# Patient Record
Sex: MALE | Race: BLACK OR AFRICAN AMERICAN | NOT HISPANIC OR LATINO | Employment: FULL TIME | URBAN - METROPOLITAN AREA
[De-identification: names, ages, dates, MRNs, and addresses within clinical notes are randomized per-mention and may not be internally consistent; named-entity substitution may affect disease eponyms.]

---

## 2017-02-10 ENCOUNTER — OFFICE VISIT (OUTPATIENT)
Dept: CARDIOLOGY | Facility: CLINIC | Age: 52
End: 2017-02-10
Payer: COMMERCIAL

## 2017-02-10 ENCOUNTER — CLINICAL SUPPORT (OUTPATIENT)
Dept: CARDIOLOGY | Facility: CLINIC | Age: 52
End: 2017-02-10
Payer: COMMERCIAL

## 2017-02-10 VITALS
SYSTOLIC BLOOD PRESSURE: 132 MMHG | WEIGHT: 251 LBS | BODY MASS INDEX: 34 KG/M2 | HEIGHT: 72 IN | HEART RATE: 65 BPM | DIASTOLIC BLOOD PRESSURE: 80 MMHG

## 2017-02-10 DIAGNOSIS — E78.5 HYPERLIPIDEMIA, UNSPECIFIED HYPERLIPIDEMIA TYPE: ICD-10-CM

## 2017-02-10 DIAGNOSIS — B20 HIV (HUMAN IMMUNODEFICIENCY VIRUS INFECTION): ICD-10-CM

## 2017-02-10 DIAGNOSIS — B19.20 HEPATITIS C VIRUS INFECTION WITHOUT HEPATIC COMA, UNSPECIFIED CHRONICITY: ICD-10-CM

## 2017-02-10 DIAGNOSIS — R07.9 CHEST PAIN SYNDROME: Primary | ICD-10-CM

## 2017-02-10 DIAGNOSIS — R07.9 CHEST PAIN, UNSPECIFIED TYPE: Primary | ICD-10-CM

## 2017-02-10 DIAGNOSIS — R07.9 CHEST PAIN, UNSPECIFIED TYPE: ICD-10-CM

## 2017-02-10 PROCEDURE — 99999 PR PBB SHADOW E&M-NEW PATIENT-LVL III: CPT | Mod: PBBFAC,,, | Performed by: INTERNAL MEDICINE

## 2017-02-10 PROCEDURE — 99204 OFFICE O/P NEW MOD 45 MIN: CPT | Mod: S$GLB,,, | Performed by: INTERNAL MEDICINE

## 2017-02-10 PROCEDURE — 93000 ELECTROCARDIOGRAM COMPLETE: CPT | Mod: S$GLB,,, | Performed by: INTERNAL MEDICINE

## 2017-02-10 RX ORDER — ATORVASTATIN CALCIUM 10 MG/1
10 TABLET, FILM COATED ORAL DAILY
COMMUNITY

## 2017-02-10 RX ORDER — TAMSULOSIN HYDROCHLORIDE 0.4 MG/1
0.4 CAPSULE ORAL DAILY
COMMUNITY

## 2017-02-10 RX ORDER — TADALAFIL 5 MG/1
5 TABLET ORAL DAILY PRN
COMMUNITY

## 2017-02-10 RX ORDER — NITROGLYCERIN 0.4 MG/1
0.4 TABLET SUBLINGUAL EVERY 5 MIN PRN
Qty: 60 TABLET | Refills: 12 | Status: SHIPPED | OUTPATIENT
Start: 2017-02-10

## 2017-02-10 RX ORDER — PANTOPRAZOLE SODIUM 40 MG/1
40 TABLET, DELAYED RELEASE ORAL DAILY
COMMUNITY

## 2017-02-10 RX ORDER — ASPIRIN 81 MG/1
81 TABLET ORAL DAILY
Qty: 30 TABLET | Refills: 6 | Status: SHIPPED | OUTPATIENT
Start: 2017-02-10

## 2017-02-10 NOTE — PROGRESS NOTES
Subjective:   Patient ID:  Semaj Allen is a 52 y.o. male who presents for evaluation of Chest Pain      HPI  A 53 yo male with h/o hiv disease hepatitis c treated gerd htn hlp is here refered from DR MAR FOR CHEST PAIN.HE WAS TREATED WITH HARVONI FOR HIS HEP C. HE AHS BEEN HAVING RECURRENT EPISODE OF CHEST PAIN IN THE MIDDLE OF THE CHEST THAT OCCURRED ON Sunday WHILE SITTING DOWN NOT HAVING ANY ACTIVITY IT WAS IN THE MIDDLE OF THE CHEST WAS SEVERE HE DOUBLED OVER HE TOOK 2 ASPIRINS AND IT RESOLVED ON ITS OWN HE FELT BAD.,THEN Wednesday HE HAD ANOTHER EPISODE WHILE PULLING A BUGGY AT HIS JOB AT [facility] [employer]. HE HAS ALSO THE SAME FEELING IT RESOLVED AFTER FEW MINUTES NO SWEATING NO SHORTNESS OF BREATH., HE HAS NO CLAUDICATION TIA HE HAS NO SYMPTOMS OF CHF HAS SNORING MINIMAL NO BLEEDING ISSUES  EKG SHOWS LOW ANTERIOR  FORCES. HE WA SIN THE LOBBY NOW FEELING NUMBNESS IN HIS ARM BUT NO CHEST PAIN BUT FELT IT IS GOING TO HAPPEN.  Past Medical History   Diagnosis Date    Allergic rhinitis     Chest pain syndrome 2/10/2017    Depression     GERD (gastroesophageal reflux disease)     Hepatitis C     Hepatitis C 2/10/2017    Herpes simplex     HIV (human immunodeficiency virus infection) 2/10/2017    HIV disease     Hyperlipidemia     Hypertrophy of prostate with urinary obstruction and other lower urinary tract symptoms (LUTS)        Past Surgical History   Procedure Laterality Date    Jaw surgey      Leg surgery         Social History   Substance Use Topics    Smoking status: Never Smoker    Smokeless tobacco: None    Alcohol use Yes       No family history on file.    Current Outpatient Prescriptions   Medication Sig    abacavir-dolutegravir-lamivud (TRIUMEQ) 600- mg Tab Take by mouth once daily.    atorvastatin (LIPITOR) 10 MG tablet Take 10 mg by mouth once daily.    pantoprazole (PROTONIX) 40 MG tablet Take 40 mg by mouth once daily.    tadalafil (CIALIS) 5 MG tablet Take 5 mg by mouth daily as  needed for Erectile Dysfunction.    tamsulosin (FLOMAX) 0.4 mg Cp24 Take 0.4 mg by mouth once daily.     No current facility-administered medications for this visit.      No current outpatient prescriptions on file prior to visit.     No current facility-administered medications on file prior to visit.        Review of patient's allergies indicates:  No Known Allergies    Review of Systems   Constitution: Positive for weight gain. Negative for weakness and malaise/fatigue.   HENT: Negative for headaches.    Eyes: Negative for blurred vision.   Cardiovascular: Positive for chest pain. Negative for claudication, cyanosis, dyspnea on exertion, irregular heartbeat, leg swelling, near-syncope, orthopnea, palpitations and paroxysmal nocturnal dyspnea.   Respiratory: Positive for shortness of breath and snoring. Negative for cough and hemoptysis.    Hematologic/Lymphatic: Negative for bleeding problem. Does not bruise/bleed easily.   Skin: Negative for dry skin and itching.   Musculoskeletal: Negative for falls, muscle weakness and myalgias.   Gastrointestinal: Negative for abdominal pain, diarrhea, heartburn, hematemesis, hematochezia and melena.   Genitourinary: Positive for nocturia. Negative for flank pain and hematuria.   Neurological: Negative for dizziness, focal weakness, light-headedness, numbness, paresthesias and seizures.   Psychiatric/Behavioral: Negative for altered mental status and memory loss. The patient is not nervous/anxious.    Allergic/Immunologic: Negative for hives.       Objective:   Physical Exam   Constitutional: He is oriented to person, place, and time. He appears well-developed and well-nourished. No distress.   HENT:   Head: Normocephalic and atraumatic.   Eyes: EOM are normal. Pupils are equal, round, and reactive to light. Right eye exhibits no discharge. Left eye exhibits no discharge.   Neck: Neck supple. No JVD present. No thyromegaly present.   Cardiovascular: Normal rate, regular  rhythm, normal heart sounds and intact distal pulses.  Exam reveals no gallop and no friction rub.    No murmur heard.  Pulmonary/Chest: Effort normal and breath sounds normal. No respiratory distress. He has no wheezes. He has no rales. He exhibits no tenderness.   Abdominal: Soft. Bowel sounds are normal. He exhibits no distension. There is no tenderness.   OBESE ABDOMEN   Musculoskeletal: Normal range of motion. He exhibits no edema.   Neurological: He is alert and oriented to person, place, and time. No cranial nerve deficit.   Skin: Skin is warm and dry. No rash noted. He is not diaphoretic. No erythema.   Psychiatric: He has a normal mood and affect. His behavior is normal.   Nursing note and vitals reviewed.    Vitals:    02/10/17 1512   BP: 132/80   BP Location: Right arm   Patient Position: Sitting   BP Method: Manual   Pulse: 65   Weight: 113.9 kg (251 lb)   Height: 6' (1.829 m)     No results found for: CHOL  No results found for: HDL  No results found for: LDLCALC  No results found for: TRIG  No results found for: CHOLHDL    Chemistry    No results found for: NA, K, CL, CO2, BUN, CREATININE, GLU No results found for: CALCIUM, ALKPHOS, AST, ALT, BILITOT       No results found for: TSH  No results found for: INR, PROTIME  No results found for: WBC, HGB, HCT, MCV, PLT  BNP  @LABRCNTIP(BNP,BNPTRIAGEBLO)@  CrCl cannot be calculated (Patient has no serum creatinine result on file.).  Assessment:     1. Chest pain syndrome    2. Hyperlipidemia, unspecified hyperlipidemia type    3. HIV (human immunodeficiency virus infection)    4. Hepatitis C virus infection without hepatic coma, unspecified chronicity        Plan:   HAS FEATURES OF TYPICAL AND ATYPICAL  ANGINA HE HAS MULTIPLE RISK FACTORS FOR CAD INCLUDING HIS ANTIRETROVIRAL MEDS  AND HYPERLIPIDEMIA FH AND HEPATITIS THERAPY. WILL NEED TO INVESTIGATE WITH STRESS ECHO   HE WAS GIVEN NITRO S/L PRESCRIPTION AND ASA EC 81 MG PO DAILY  FURTHER FOLLOW UP PENDING  TEST RESULTS.

## 2017-02-10 NOTE — LETTER
February 10, 2017      Sigrid Torres MD  [address]  [place] [address]           [facility] - Cardiology  [address]  [place] [address]  Phone: [phone]  Fax: [phone]          Patient: Semaj Allen   MR Number: [id number]   YOB: 1965   Date of Visit: 2/10/2017       Dear Dr. Sigrid Torres:    Thank you for referring Semaj Allen to me for evaluation. Attached you will find relevant portions of my assessment and plan of care.    If you have questions, please do not hesitate to call me. I look forward to following Semaj Allen along with you.    Sincerely,    Ishmael Dias MD    Enclosure  CC:  No Recipients    If you would like to receive this communication electronically, please contact [email or web address] or [phone] to request more information on [facility] access.    For providers and/or their staff who would like to refer a patient to [facility], please contact us through our one-stop-shop provider referral line, [facility], at [phone].    If you feel you have received this communication in error or would no longer like to receive these types of communications, please e-mail [email or web address]

## 2017-02-10 NOTE — MR AVS SNAPSHOT
[facility] Cardiology  [address] [place] [address]  Phone: [phone]  Fax: [phone]                  Semaj Allen   2/10/2017 2:15 PM   Office Visit    Description:  Male : 1965   Provider:  Ishmael Dias MD   Department:  [facility] - [facility]           Reason for Visit     Chest Pain           Diagnoses this Visit        Comments    Chest pain syndrome    -  Primary     Hyperlipidemia, unspecified hyperlipidemia type         HIV (human immunodeficiency virus infection)         Hepatitis C virus infection without hepatic coma, unspecified chronicity                To Do List           Goals (5 Years of Data)     None       These Medications        Disp Refills Start End    aspirin (ECOTRIN) 81 MG EC tablet 30 tablet 6 2/10/2017     Take 1 tablet (81 mg total) by mouth once daily. - Oral    nitroGLYCERIN (NITROSTAT) 0.4 MG SL tablet 60 tablet 12 2/10/2017     Place 1 tablet (0.4 mg total) under the tongue every 5 (five) minutes as needed for Chest pain. - Sublingual      [facility]     [facility] On Call Nurse Care Line -  Assistance  Registered nurses in the [facility] Center provide clinical advisement, health education, appointment booking, and other advisory services.  Call for this free service at [phone].             Medications           Message regarding Medications     Verify the changes and/or additions to your medication regime listed below are the same as discussed with your clinician today.  If any of these changes or additions are incorrect, please notify your healthcare provider.        START taking these NEW medications        Refills    aspirin (ECOTRIN) 81 MG EC tablet 6    Sig: Take 1 tablet (81 mg total) by mouth once daily.    Class: Print    Route: Oral    nitroGLYCERIN (NITROSTAT) 0.4 MG SL tablet 12    Sig: Place 1 tablet (0.4 mg total) under the tongue every 5 (five) minutes as needed for Chest pain.    Class: Print    Route: Sublingual            Verify that the below list of medications is an accurate representation of the medications you are currently taking.  If none reported, the list may be blank. If incorrect, please contact your healthcare provider. Carry this list with you in case of emergency.           Current Medications     abacavir-dolutegravir-lamivud (TRIUMEQ) 600- mg Tab Take by mouth once daily.    aspirin (ECOTRIN) 81 MG EC tablet Take 1 tablet (81 mg total) by mouth once daily.    atorvastatin (LIPITOR) 10 MG tablet Take 10 mg by mouth once daily.    nitroGLYCERIN (NITROSTAT) 0.4 MG SL tablet Place 1 tablet (0.4 mg total) under the tongue every 5 (five) minutes as needed for Chest pain.    pantoprazole (PROTONIX) 40 MG tablet Take 40 mg by mouth once daily.    tadalafil (CIALIS) 5 MG tablet Take 5 mg by mouth daily as needed for Erectile Dysfunction.    tamsulosin (FLOMAX) 0.4 mg Cp24 Take 0.4 mg by mouth once daily.           Clinical Reference Information           Your Vitals Were     BP Pulse Height Weight BMI    132/80 (BP Location: Right arm, Patient Position: Sitting, BP Method: Manual) 65 6' (1.829 m) 113.9 kg (251 lb) 34.04 kg/m2      Blood Pressure          Most Recent Value    Right Arm BP - Sitting  132/80    Left Arm BP - Sitting  124/80    BP  132/80      Allergies as of 2/10/2017     No Known Allergies      Immunizations Administered on Date of Encounter - 2/10/2017     None      Orders Placed During Today's Visit     Future Labs/Procedures Expected by Expires    EKG 12-LEAD - Muse  As directed 2/10/2018    Exercise stress echo with color flow  As directed 2/10/2018      [facility] Sign-Up     Activating your [facility] account is as easy as 1-2-3!     1) Visit [email or web address], select Sign Up Now, enter this activation code and your date of birth, then select Next.  [email or web address]  Expires: 3/27/2017  1:05 PM      2) Create a username and password to use when you visit [facility] in the future and select a  security question in case you lose your password and select Next.    3) Enter your e-mail address and click Sign Up!    Additional Information  If you have questions, please e-mail [email or web address] or call [phone] to talk to our [facility] staff. Remember, [facility] is NOT to be used for urgent needs. For medical emergencies, dial 911.         Language Assistance Services     ATTENTION: Language assistance services are available, free of charge. Please call [phone].      ATENCIÓN: Si habla español, tiene a garcia disposición servicios gratuitos de asistencia lingüística. Llame al [phone].     CHÚ Ý: N?u b?n nói Ti?ng Vi?t, có các d?ch v? h? tr? ngôn ng? mi?n phí dành cho b?n. G?i s? [phone].         [facility] complies with applicable Federal civil rights laws and does not discriminate on the basis of race, color, national origin, age, disability, or sex.

## 2017-02-15 ENCOUNTER — CLINICAL SUPPORT (OUTPATIENT)
Dept: CARDIOLOGY | Facility: CLINIC | Age: 52
End: 2017-02-15
Payer: COMMERCIAL

## 2017-02-15 DIAGNOSIS — R07.9 CHEST PAIN SYNDROME: ICD-10-CM

## 2017-02-15 PROCEDURE — 93351 STRESS TTE COMPLETE: CPT | Mod: S$GLB,,, | Performed by: INTERNAL MEDICINE

## 2017-02-15 PROCEDURE — 93325 DOPPLER ECHO COLOR FLOW MAPG: CPT | Mod: S$GLB,,, | Performed by: INTERNAL MEDICINE

## 2017-02-15 PROCEDURE — 93320 DOPPLER ECHO COMPLETE: CPT | Mod: S$GLB,,, | Performed by: INTERNAL MEDICINE

## 2017-02-16 ENCOUNTER — TELEPHONE (OUTPATIENT)
Dept: CARDIOLOGY | Facility: CLINIC | Age: 52
End: 2017-02-16

## 2017-02-16 LAB
AORTIC VALVE REGURGITATION: NORMAL
DIASTOLIC DYSFUNCTION: NO
ESTIMATED PA SYSTOLIC PRESSURE: 23.61
RETIRED EF AND QEF - SEE NOTES: 55 (ref 55–65)
TRICUSPID VALVE REGURGITATION: NORMAL

## 2017-02-16 NOTE — TELEPHONE ENCOUNTER
----- Message from Ishmael Dias MD sent at 2/16/2017  3:32 PM CST -----  Contact: Patient  richard if he is continuing to have chest pain he needs to come in to talk make sure we do not need to cath him. Let us have a visit with him.  ----- Message -----     From: Richard Fabian LPN     Sent: 2/16/2017   2:53 PM       To: Ishmael Dias MD    This Dr. Torres's kellie  ----- Message -----     From: Rosa Hernandez     Sent: 2/16/2017   9:37 AM       To: [facility] Staff    Patient had another episode with his heart, chest pain, numbness in arm,  Lasting 3-4 minutes, patient would like to talk to nurse regarding this. Please call patient back at [phone]. Thank you

## 2017-02-16 NOTE — TELEPHONE ENCOUNTER
----- Message from Ishmael Dias MD sent at 2/16/2017 12:28 PM CST -----  Stress test normal  Received result of Stress Echo but with same complaints and advised may need to discuss heart cath

## 2017-02-17 ENCOUNTER — TELEPHONE (OUTPATIENT)
Dept: CARDIOLOGY | Facility: CLINIC | Age: 52
End: 2017-02-17

## 2017-02-17 ENCOUNTER — HOSPITAL ENCOUNTER (OUTPATIENT)
Dept: RADIOLOGY | Facility: HOSPITAL | Age: 52
Discharge: HOME | End: 2017-02-17
Attending: INTERNAL MEDICINE
Payer: COMMERCIAL

## 2017-02-17 ENCOUNTER — OFFICE VISIT (OUTPATIENT)
Dept: CARDIOLOGY | Facility: CLINIC | Age: 52
End: 2017-02-17
Payer: COMMERCIAL

## 2017-02-17 VITALS
DIASTOLIC BLOOD PRESSURE: 88 MMHG | HEIGHT: 72 IN | WEIGHT: 254.19 LBS | HEART RATE: 88 BPM | SYSTOLIC BLOOD PRESSURE: 148 MMHG | BODY MASS INDEX: 34.43 KG/M2

## 2017-02-17 DIAGNOSIS — B20 HIV (HUMAN IMMUNODEFICIENCY VIRUS INFECTION): ICD-10-CM

## 2017-02-17 DIAGNOSIS — B19.20 HEPATITIS C VIRUS INFECTION WITHOUT HEPATIC COMA, UNSPECIFIED CHRONICITY: ICD-10-CM

## 2017-02-17 DIAGNOSIS — R07.9 CHEST PAIN SYNDROME: ICD-10-CM

## 2017-02-17 DIAGNOSIS — E78.5 HYPERLIPIDEMIA, UNSPECIFIED HYPERLIPIDEMIA TYPE: ICD-10-CM

## 2017-02-17 DIAGNOSIS — R07.9 CHEST PAIN SYNDROME: Primary | ICD-10-CM

## 2017-02-17 PROCEDURE — 71020 XR CHEST PA AND LATERAL: CPT | Mod: 26,,, | Performed by: RADIOLOGY

## 2017-02-17 PROCEDURE — 99999 PR PBB SHADOW E&M-EST. PATIENT-LVL III: CPT | Mod: PBBFAC,,, | Performed by: INTERNAL MEDICINE

## 2017-02-17 PROCEDURE — 71020 XR CHEST PA AND LATERAL: CPT | Mod: TC,PO

## 2017-02-17 PROCEDURE — 99214 OFFICE O/P EST MOD 30 MIN: CPT | Mod: S$GLB,,, | Performed by: INTERNAL MEDICINE

## 2017-02-17 RX ORDER — AMLODIPINE BESYLATE 2.5 MG/1
2.5 TABLET ORAL DAILY
Qty: 30 TABLET | Refills: 11 | Status: SHIPPED | OUTPATIENT
Start: 2017-02-17 | End: 2023-05-26

## 2017-02-17 NOTE — TELEPHONE ENCOUNTER
----- Message from Lisa Rivera sent at 2/17/2017  2:39 PM CST -----  Contact: Patient  Patient called to speak with Dyan about rescheduling his procedure. He can be contacted at [phone].    Thanks,  Lisa

## 2017-02-17 NOTE — MR AVS SNAPSHOT
[facility] - Cardiology  [address] [place] [address]  Phone: [phone]  Fax: [phone]                  Semaj Allen   2017 10:15 AM   Office Visit    Description:  Male : 1965   Provider:  Ishmael Dias MD   Department:  [facility] Card[facility]           Reason for Visit     Chest Pain           Diagnoses this Visit        Comments    Chest pain syndrome    -  Primary     Hepatitis C virus infection without hepatic coma, unspecified chronicity         HIV (human immunodeficiency virus infection)         Hyperlipidemia, unspecified hyperlipidemia type                To Do List           Goals (5 Years of Data)     None       These Medications        Disp Refills Start End    amlodipine (NORVASC) 2.5 MG tablet 30 tablet 11 2017    Take 1 tablet (2.5 mg total) by mouth once daily. - Oral      [facility]     [facility] Nurse Care [facility] -  Assistance  Registered nurses in the [facility] provide clinical advisement, health education, appointment booking, and other advisory services.  Call for this free service at [phone].             Medications           Message regarding Medications     Verify the changes and/or additions to your medication regime listed below are the same as discussed with your clinician today.  If any of these changes or additions are incorrect, please notify your healthcare provider.        START taking these NEW medications        Refills    amlodipine (NORVASC) 2.5 MG tablet 11    Sig: Take 1 tablet (2.5 mg total) by mouth once daily.    Class: Print    Route: Oral           Verify that the below list of medications is an accurate representation of the medications you are currently taking.  If none reported, the list may be blank. If incorrect, please contact your healthcare provider. Carry this list with you in case of emergency.           Current Medications     abacavir-dolutegravir-lamivud (TRIUMEQ) 600-  mg Tab Take by mouth once daily.    amlodipine (NORVASC) 2.5 MG tablet Take 1 tablet (2.5 mg total) by mouth once daily.    aspirin (ECOTRIN) 81 MG EC tablet Take 1 tablet (81 mg total) by mouth once daily.    atorvastatin (LIPITOR) 10 MG tablet Take 10 mg by mouth once daily.    nitroGLYCERIN (NITROSTAT) 0.4 MG SL tablet Place 1 tablet (0.4 mg total) under the tongue every 5 (five) minutes as needed for Chest pain.    pantoprazole (PROTONIX) 40 MG tablet Take 40 mg by mouth once daily.    tadalafil (CIALIS) 5 MG tablet Take 5 mg by mouth daily as needed for Erectile Dysfunction.    tamsulosin (FLOMAX) 0.4 mg Cp24 Take 0.4 mg by mouth once daily.           Clinical Reference Information           Your Vitals Were     BP Pulse Height Weight BMI    148/88 88 6' (1.829 m) 115.3 kg (254 lb 3.1 oz) 34.47 kg/m2      Blood Pressure          Most Recent Value    Right Arm BP - Sitting  148/88    Left Arm BP - Sitting  132/90    BP  (!)  148/88      Allergies as of 2/17/2017     No Known Allergies      Immunizations Administered on Date of Encounter - 2/17/2017     None      [facility] Sign-Up     Activating your [facility] account is as easy as 1-2-3!     1) Visit [email or web address], select Sign Up Now, enter this activation code and your date of birth, then select Next.  [email or web address]  Expires: 3/27/2017  1:05 PM      2) Create a username and password to use when you visit [facility] in the future and select a security question in case you lose your password and select Next.    3) Enter your e-mail address and click Sign Up!    Additional Information  If you have questions, please e-mail [email or web address] or call [phone] to talk to our [facility] staff. Remember, [facility] is NOT to be used for urgent needs. For medical emergencies, dial 911.         Language Assistance Services     ATTENTION: Language assistance services are available, free of charge. Please call [phone].      ATENCIÓN: obed Rizvi  a garcia disposición servicios gratuitos de asistencia lingüística. Llbong al [phone].     SARA Ý: N?u b?n nói Ti?ng Vi?t, có các d?ch v? h? tr? ngôn ng? mi?n phí dành cho b?n. G?i s? [phone].         [facility] complies with applicable Federal civil rights laws and does not discriminate on the basis of race, color, national origin, age, disability, or sex.

## 2017-02-17 NOTE — PROGRESS NOTES
Subjective:   Patient ID:  Semaj Allen is a 52 y.o. male who presents for follow up of Chest Pain      HPI  A 51 yo male who has hep c hiv was seen initially for chest pain had stress echo that was negative .he is still having chest pain that eh describes as sharp chest pain occurred in the morning associated with shortness of breath he has  Nausea but no diaphoresis no exertional symptoms.he ahd a stress echo that was negative. He si concerned due to his multiple risk factors for cad and wants to be absolutely sure he ahs no cad.  si willing to have an angio for that to resolve this atypical pain.  Past Medical History   Diagnosis Date    Allergic rhinitis     Chest pain syndrome 2/10/2017    Depression     GERD (gastroesophageal reflux disease)     Hepatitis C     Hepatitis C 2/10/2017    Herpes simplex     HIV (human immunodeficiency virus infection) 2/10/2017    HIV disease     Hyperlipidemia     Hypertrophy of prostate with urinary obstruction and other lower urinary tract symptoms (LUTS)        Past Surgical History   Procedure Laterality Date    Jaw surgey      Leg surgery         Social History   Substance Use Topics    Smoking status: Former Smoker     Years: 10.00     Types: Cigarettes     Quit date: 1990    Smokeless tobacco: None    Alcohol use Yes       History reviewed. No pertinent family history.    Current Outpatient Prescriptions   Medication Sig    abacavir-dolutegravir-lamivud (TRIUMEQ) 600- mg Tab Take by mouth once daily.    aspirin (ECOTRIN) 81 MG EC tablet Take 1 tablet (81 mg total) by mouth once daily.    atorvastatin (LIPITOR) 10 MG tablet Take 10 mg by mouth once daily.    nitroGLYCERIN (NITROSTAT) 0.4 MG SL tablet Place 1 tablet (0.4 mg total) under the tongue every 5 (five) minutes as needed for Chest pain.    pantoprazole (PROTONIX) 40 MG tablet Take 40 mg by mouth once daily.    tadalafil (CIALIS) 5 MG tablet Take 5 mg by mouth daily as needed for  Erectile Dysfunction.    tamsulosin (FLOMAX) 0.4 mg Cp24 Take 0.4 mg by mouth once daily.     No current facility-administered medications for this visit.      Current Outpatient Prescriptions on File Prior to Visit   Medication Sig    abacavir-dolutegravir-lamivud (TRIUMEQ) 600- mg Tab Take by mouth once daily.    aspirin (ECOTRIN) 81 MG EC tablet Take 1 tablet (81 mg total) by mouth once daily.    atorvastatin (LIPITOR) 10 MG tablet Take 10 mg by mouth once daily.    nitroGLYCERIN (NITROSTAT) 0.4 MG SL tablet Place 1 tablet (0.4 mg total) under the tongue every 5 (five) minutes as needed for Chest pain.    pantoprazole (PROTONIX) 40 MG tablet Take 40 mg by mouth once daily.    tadalafil (CIALIS) 5 MG tablet Take 5 mg by mouth daily as needed for Erectile Dysfunction.    tamsulosin (FLOMAX) 0.4 mg Cp24 Take 0.4 mg by mouth once daily.     No current facility-administered medications on file prior to visit.      Review of patient's allergies indicates:  No Known Allergies  ROS  Constitution: Positive for weight gain. Negative for weakness and malaise/fatigue.   HENT: Negative for headaches.   Eyes: Negative for blurred vision.   Cardiovascular: Positive for chest pain. Negative for claudication, cyanosis, dyspnea on exertion, irregular heartbeat, leg swelling, near-syncope, orthopnea, palpitations and paroxysmal nocturnal dyspnea.   Respiratory: Positive for shortness of breath and snoring. Negative for cough and hemoptysis.   Hematologic/Lymphatic: Negative for bleeding problem. Does not bruise/bleed easily.   Skin: Negative for dry skin and itching.   Musculoskeletal: Negative for falls, muscle weakness and myalgias.   Gastrointestinal: Negative for abdominal pain, diarrhea, heartburn, hematemesis, hematochezia and melena.   Genitourinary: Positive for nocturia. Negative for flank pain and hematuria.   Neurological: Negative for dizziness, focal weakness, light-headedness, numbness, paresthesias and  seizures.   Psychiatric/Behavioral: Negative for altered mental status and memory loss. The patient is not nervous/anxious.   Allergic/Immunologic: Negative for hives.   Objective:   Physical Exam  Vitals:    02/17/17 1009   BP: (!) 148/88   Pulse: 88   Weight: 115.3 kg (254 lb 3.1 oz)   Height: 6' (1.829 m)   Constitutional: He is oriented to person, place, and time. He appears well-developed and well-nourished. No distress.   HENT:   Head: Normocephalic and atraumatic.   Eyes: EOM are normal. Pupils are equal, round, and reactive to light. Right eye exhibits no discharge. Left eye exhibits no discharge.   Neck: Neck supple. No JVD present. No thyromegaly present.   Cardiovascular: Normal rate, regular rhythm, normal heart sounds and intact distal pulses. Exam reveals no gallop and no friction rub.   No murmur heard.  Pulmonary/Chest: Effort normal and breath sounds normal. No respiratory distress. He has no wheezes. He has no rales. He exhibits no tenderness.   Abdominal: Soft. Bowel sounds are normal. He exhibits no distension. There is no tenderness.   OBESE ABDOMEN   Musculoskeletal: Normal range of motion. He exhibits no edema.   Neurological: He is alert and oriented to person, place, and time. No cranial nerve deficit.   Skin: Skin is warm and dry. No rash noted. He is not diaphoretic. No erythema.   Psychiatric: He has a normal mood and affect. His behavior is normal.   Nursing note and vitals reviewed.  No results found for: CHOL  No results found for: HDL  No results found for: LDLCALC  No results found for: TRIG  No results found for: CHOLHDL    Chemistry    No results found for: NA, K, CL, CO2, BUN, CREATININE, GLU No results found for: CALCIUM, ALKPHOS, AST, ALT, BILITOT       No results found for: TSH  No results found for: INR, PROTIME  No results found for: WBC, HGB, HCT, MCV, PLT  BMP  No results found for: NA, K, CL, CO2, BUN, CREATININE, CALCIUM, ANIONGAP, ESTGFRAFRICA, EGFRNONAA  CrCl cannot be  calculated (Patient has no serum creatinine result on file.).    Assessment:     1. Chest pain syndrome    2. Hepatitis C virus infection without hepatic coma, unspecified chronicity    3. HIV (human immunodeficiency virus infection)    4. Hyperlipidemia, unspecified hyperlipidemia type      Atypical chest pain with multiple risk factors for cad continues to have chest pain will plan on angio to assure him he ahs no cad .    Plan:   l[facility]cf via rt radial approach   I have explained the risks, benefits , and alternatives of the procedure in detail.the patient voices understanding and all questions have been answered.the patient agrees to proceed as planned.

## 2017-03-06 ENCOUNTER — HOSPITAL ENCOUNTER (OUTPATIENT)
Facility: HOSPITAL | Age: 52
Discharge: HOME | End: 2017-03-06
Attending: INTERNAL MEDICINE | Admitting: INTERNAL MEDICINE
Payer: COMMERCIAL

## 2017-03-06 ENCOUNTER — SURGERY (OUTPATIENT)
Age: 52
End: 2017-03-06

## 2017-03-06 VITALS
BODY MASS INDEX: 33.18 KG/M2 | RESPIRATION RATE: 11 BRPM | WEIGHT: 245 LBS | TEMPERATURE: 98 F | OXYGEN SATURATION: 96 % | DIASTOLIC BLOOD PRESSURE: 70 MMHG | HEART RATE: 65 BPM | SYSTOLIC BLOOD PRESSURE: 117 MMHG | HEIGHT: 72 IN

## 2017-03-06 DIAGNOSIS — R07.9 CHEST PAIN SYNDROME: Primary | ICD-10-CM

## 2017-03-06 DIAGNOSIS — Z21 ASYMPTOMATIC HUMAN IMMUNODEFICIENCY VIRUS (HIV) INFECTION STATUS: ICD-10-CM

## 2017-03-06 DIAGNOSIS — R07.9 CHEST PAIN: ICD-10-CM

## 2017-03-06 DIAGNOSIS — R07.9 CHEST PAIN, UNSPECIFIED TYPE: ICD-10-CM

## 2017-03-06 PROCEDURE — 63600175 PHARM REV CODE 636 W HCPCS

## 2017-03-06 PROCEDURE — 25000003 PHARM REV CODE 250

## 2017-03-06 PROCEDURE — 99152 MOD SED SAME PHYS/QHP 5/>YRS: CPT | Mod: ,,, | Performed by: INTERNAL MEDICINE

## 2017-03-06 PROCEDURE — 93458 L HRT ARTERY/VENTRICLE ANGIO: CPT | Mod: 26,,, | Performed by: INTERNAL MEDICINE

## 2017-03-06 PROCEDURE — C1769 GUIDE WIRE: HCPCS

## 2017-03-06 RX ORDER — SODIUM CHLORIDE 9 MG/ML
INJECTION, SOLUTION INTRAVENOUS CONTINUOUS
Status: DISCONTINUED | OUTPATIENT
Start: 2017-03-06 | End: 2017-03-06 | Stop reason: HOSPADM

## 2017-03-06 RX ORDER — DIPHENHYDRAMINE HCL 50 MG
50 CAPSULE ORAL ONCE
Status: COMPLETED | OUTPATIENT
Start: 2017-03-06 | End: 2017-03-06

## 2017-03-06 RX ORDER — DIAZEPAM 5 MG/1
5 TABLET ORAL ONCE
Status: DISCONTINUED | OUTPATIENT
Start: 2017-03-06 | End: 2017-03-06 | Stop reason: HOSPADM

## 2017-03-06 RX ORDER — NAPROXEN SODIUM 220 MG/1
81 TABLET, FILM COATED ORAL ONCE
Status: DISCONTINUED | OUTPATIENT
Start: 2017-03-06 | End: 2017-03-06 | Stop reason: HOSPADM

## 2017-03-06 RX ORDER — DIAZEPAM 5 MG/1
5 TABLET ORAL EVERY 6 HOURS PRN
Status: DISCONTINUED | OUTPATIENT
Start: 2017-03-06 | End: 2017-03-06

## 2017-03-06 RX ORDER — DIPHENHYDRAMINE HCL 50 MG
50 CAPSULE ORAL ONCE
Status: DISCONTINUED | OUTPATIENT
Start: 2017-03-06 | End: 2017-03-06

## 2017-03-06 RX ADMIN — Medication 50 MG: at 12:03

## 2017-03-06 RX ADMIN — SODIUM CHLORIDE: 9 INJECTION, SOLUTION INTRAVENOUS at 12:03

## 2017-03-06 NOTE — INTERVAL H&P NOTE
The patient has been examined and the H&P has been reviewed:    I concur with the findings and no changes have occurred since H&P was written.    Anesthesia/Surgery risks, benefits and alternative options discussed and understood by patient/family.  I have explained the risks, benefits , and alternatives of the procedure in detail.the patient voices understanding and all questions have been answered.the patient agrees to proceed as planned.          Active Hospital Problems    Diagnosis  POA    Chest pain [R07.9]  Yes     Priority: High      Resolved Hospital Problems    Diagnosis Date Resolved POA   No resolved problems to display.

## 2017-03-06 NOTE — PLAN OF CARE
Problem: Patient Care Overview  Goal: Discharge Needs Assessment  Outcome: Outcome(s) achieved Date Met:  03/06/17  1608 discharged.  ESCORTED VIA W/C TO FRONT DOORS

## 2017-03-06 NOTE — IP AVS SNAPSHOT
[facility]  [id number] [place] [address] [address] [place] [id number]           Patient Discharge Instructions     Our goal is to set you up for success. This packet includes information on your condition, medications, and your home care. It will help you to care for yourself so you don't get sicker and need to go back to the hospital.     Please ask your nurse if you have any questions.        There are many details to remember when preparing to leave the hospital. Here is what you will need to do:    1. Take your medicine. If you are prescribed medications, review your Medication List in the following pages. You may have new medications to  at the pharmacy and others that you'll need to stop taking. Review the instructions for how and when to take your medications. Talk with your doctor or nurses if you are unsure of what to do.     2. Go to your follow-up appointments. Specific follow-up information is listed in the following pages. Your may be contacted by a transition nurse or clinical provider about future appointments. Be sure we have all of the phone numbers to reach you, if needed. Please contact your provider's office if you are unable to make an appointment.     3. Watch for warning signs. Your doctor or nurse will give you detailed warning signs to watch for and when to call for assistance. These instructions may also include educational information about your condition. If you experience any of warning signs to your health, call your doctor.               [facility]  Unless otherwise directed by your provider, please contact [facility], our nurse care line that is available for 24/7 assistance.     [phone] (toll-free)    Registered nurses in the [facility] provide clinical advisement, health education, appointment booking, and other advisory services.                    ** Verify the list of medication(s) below is accurate and up to date. Carry this with you  in case of emergency. If your medications have changed, please notify your healthcare provider.             Medication List      CONTINUE taking these medications        Additional Info                      amlodipine 2.5 MG tablet   Commonly known as:  NORVASC   Quantity:  30 tablet   Refills:  11   Dose:  2.5 mg    Instructions:  Take 1 tablet (2.5 mg total) by mouth once daily.     Begin Date    AM    Noon    PM    Bedtime       aspirin 81 MG EC tablet   Commonly known as:  ECOTRIN   Quantity:  30 tablet   Refills:  6   Dose:  81 mg    Instructions:  Take 1 tablet (81 mg total) by mouth once daily.     Begin Date    AM    Noon    PM    Bedtime       atorvastatin 10 MG tablet   Commonly known as:  LIPITOR   Refills:  0   Dose:  10 mg    Instructions:  Take 10 mg by mouth once daily.     Begin Date    AM    Noon    PM    Bedtime       nitroGLYCERIN 0.4 MG SL tablet   Commonly known as:  NITROSTAT   Quantity:  60 tablet   Refills:  12   Dose:  0.4 mg    Instructions:  Place 1 tablet (0.4 mg total) under the tongue every 5 (five) minutes as needed for Chest pain.     Begin Date    AM    Noon    PM    Bedtime       pantoprazole 40 MG tablet   Commonly known as:  PROTONIX   Refills:  0   Dose:  40 mg    Instructions:  Take 40 mg by mouth once daily.     Begin Date    AM    Noon    PM    Bedtime       tadalafil 5 MG tablet   Commonly known as:  CIALIS   Refills:  0   Dose:  5 mg    Instructions:  Take 5 mg by mouth daily as needed for Erectile Dysfunction.     Begin Date    AM    Noon    PM    Bedtime       tamsulosin 0.4 mg Cp24   Commonly known as:  FLOMAX   Refills:  0   Dose:  0.4 mg    Instructions:  Take 0.4 mg by mouth once daily.     Begin Date    AM    Noon    PM    Bedtime       TRIUMEQ 600- mg Tab   Refills:  0   Generic drug:  abacavir-dolutegravir-lamivud    Instructions:  Take by mouth once daily.     Begin Date    AM    Noon    PM    Bedtime                  Please bring to all follow up  appointments:    1. A copy of your discharge instructions.  2. All medicines you are currently taking in their original bottles.  3. Identification and insurance card.    Please arrive 15 minutes ahead of scheduled appointment time.    Please call 24 hours in advance if you must reschedule your appointment and/or time.        Your Scheduled Appointments     Mar 16, 2017  4:30 PM CDT   Established Patient Visit with KELSI Grullon   [facility] ([facility])    [address] [place] [id number] [phone]              Follow-up Information     Follow up with KELSI Grullon In 1 week.    Specialty:  Cardiology    Contact information:    [address]  [place] [address]  [phone]          Discharge Instructions     Future Orders    Activity as tolerated     Comments:    As per post cath instructions    Call MD for:  difficulty breathing, headache or visual disturbances     Call MD for:  extreme fatigue     Call MD for:  hives     Call MD for:  persistent dizziness or light-headedness     Call MD for:  persistent nausea and vomiting     Call MD for:  redness, tenderness, or signs of infection (pain, swelling, redness, odor or green/yellow discharge around incision site)     Call MD for:  severe uncontrolled pain     Call MD for:  temperature >100.4     Diet general     Questions:    Total calories:      Fat restriction, if any:      Protein restriction, if any:      Na restriction, if any:      Fluid restriction:      Additional restrictions:          Discharge Instructions       Post-op Heart Catheterization    1. DIET: It is advisable for you to follow a diet that limits the intake of salt, sugar, saturated fats and cholesterol.     2. DRIVING: Due to sedation you received during your procedure, DO NOT drive or operate machinery for 24 hours. Avoid making critical decisions or signing legal documents until tomorrow.    3. ACTIVITY: AVOID activities that require bending of the  "affected arm/wrist for 3 days and submerging the site in water for 3 days. REMOVE the dressing the day after  the procedure, you may apply a bandaid to the site if you desire. You may RESUME       your normal activities or prescribed exercise program as instructed by your physician after 3 days.                                                                                                                 4. WOUND CARE: It is not unusual to have a small amount of bruising to appear at or near the puncture site. It is also common to have a tender "knot" develop beneath the skin at the puncture site of the wrist/arm. This is usually scar tissue and is not a cause for concern or alarm. This tender knot may take several weeks to fully resolve. The bruise will usually spread over several days. However, if the lump gets bigger, call your doctor immediately.    5. DISCOMFORT: For general discomfort at the puncture site, you may take 1 or 2 Acetaminophen (Tylenol) tablets every 4 - 6 hours as needed. (Do not take more than 4000 mg a day)    6. SIGNS AND SYMPTOMS TO REPORT:  Call your physician immediately if any of the following occur:                                            1. Loss of feeling, warmth or color to the affected arm/wrist                                                                                                          2. Mild beeding from the site                 3. Pain that is sudden, sharp or persistent in the affected arm/wrist                 4. Swelling or a change in "lump" size, increased redness or drainage at                               the puncture site                                                                               5. High fever (101 degrees or higher)    7. GO TO  THE EMERGENCY ROOM OR CALL 911 IF YOU HAVE: Chest pains or discomforts not relieved with 3 nitroglycerin doses (sublingual tablets or spray), numbness or severe pain of limb, if your limb becomes cold or " discolored or if you develop uncontrolled bleeding from the puncture site (quickly apply firm, direct pressure above the site).        Primary Diagnosis     Your primary diagnosis was:  Chest Pain      Admission Information     Date & Time Provider Department CSN    3/6/2017 11:10 AM Ishmael Dias MD [facility] [id number]      Care Providers     Provider Role Specialty Primary office phone    Ishmael Dias MD Attending Provider Cardiology [phone]    Ishmael Dias MD Surgeon  Cardiology [phone]      Your Vitals Were     BP Pulse Temp Resp Height Weight    119/61 62 98.1 °F (36.7 °C) (Oral) 13 6' (1.829 m) 111.1 kg (245 lb)    SpO2 BMI             93% 33.23 kg/m2         Recent Lab Values     No lab values to display.      Allergies as of 3/6/2017     No Known Allergies      Advance Directives     An advance directive is a document which, in the event you are no longer able to make decisions for yourself, tells your healthcare team what kind of treatment you do or do not want to receive, or who you would like to make those decisions for you.  If you do not currently have an advance directive, [facility] encourages you to create one.  For more information call:  [phone] ([phone]), [phone] ([phone]),  or log on to [email or web address].        Smoking Cessation     If you would like to quit smoking:   You may be eligible for free services if you are a [place] resident and started smoking cigarettes before September 1, 1988.  Call the Smoking Cessation [employer] (SCT) toll free at (442) 502-7568 or (087) 014-0159.   Call 1-800-QUIT-NOW if you do not meet the above criteria.            Language Assistance Services     ATTENTION: Language assistance services are available, free of charge. Please call [phone].      ATENCIÓN: Si habla español, tiene a garcia disposición servicios gratuitos de asistencia lingüística. Llame al [phone].     CHÚ Ý: N?u b?n nói Ti?ng  Vi?t, có các d?ch v? h? tr? ngôn ng? mi?n phí dành cho b?n. G?i s? [phone].        [facility] Sign-Up     Activating your [facility] account is as easy as 1-2-3!     1) Visit [email or web address], select Sign Up Now, enter this activation code and your date of birth, then select Next.  [email or web address]  Expires: 3/27/2017  1:05 PM      2) Create a username and password to use when you visit [facility] in the future and select a security question in case you lose your password and select Next.    3) Enter your e-mail address and click Sign Up!    Additional Information  If you have questions, please e-mail [email or web address] or call [phone] to talk to our [facility] staff. Remember, [facility] is NOT to be used for urgent needs. For medical emergencies, dial 911.          [facility] complies with applicable Federal civil rights laws and does not discriminate on the basis of race, color, national origin, age, disability, or sex.

## 2017-03-06 NOTE — H&P (VIEW-ONLY)
Subjective:   Patient ID:  Semaj Allen is a 52 y.o. male who presents for follow up of Chest Pain      HPI  A 53 yo male who has hep c hiv was seen initially for chest pain had stress echo that was negative .he is still having chest pain that eh describes as sharp chest pain occurred in the morning associated with shortness of breath he has  Nausea but no diaphoresis no exertional symptoms.he ahd a stress echo that was negative. He si concerned due to his multiple risk factors for cad and wants to be absolutely sure he ahs no cad.  si willing to have an angio for that to resolve this atypical pain.  Past Medical History   Diagnosis Date    Allergic rhinitis     Chest pain syndrome 2/10/2017    Depression     GERD (gastroesophageal reflux disease)     Hepatitis C     Hepatitis C 2/10/2017    Herpes simplex     HIV (human immunodeficiency virus infection) 2/10/2017    HIV disease     Hyperlipidemia     Hypertrophy of prostate with urinary obstruction and other lower urinary tract symptoms (LUTS)        Past Surgical History   Procedure Laterality Date    Jaw surgey      Leg surgery         Social History   Substance Use Topics    Smoking status: Former Smoker     Years: 10.00     Types: Cigarettes     Quit date: 1990    Smokeless tobacco: None    Alcohol use Yes       History reviewed. No pertinent family history.    Current Outpatient Prescriptions   Medication Sig    abacavir-dolutegravir-lamivud (TRIUMEQ) 600- mg Tab Take by mouth once daily.    aspirin (ECOTRIN) 81 MG EC tablet Take 1 tablet (81 mg total) by mouth once daily.    atorvastatin (LIPITOR) 10 MG tablet Take 10 mg by mouth once daily.    nitroGLYCERIN (NITROSTAT) 0.4 MG SL tablet Place 1 tablet (0.4 mg total) under the tongue every 5 (five) minutes as needed for Chest pain.    pantoprazole (PROTONIX) 40 MG tablet Take 40 mg by mouth once daily.    tadalafil (CIALIS) 5 MG tablet Take 5 mg by mouth daily as needed for  Erectile Dysfunction.    tamsulosin (FLOMAX) 0.4 mg Cp24 Take 0.4 mg by mouth once daily.     No current facility-administered medications for this visit.      Current Outpatient Prescriptions on File Prior to Visit   Medication Sig    abacavir-dolutegravir-lamivud (TRIUMEQ) 600- mg Tab Take by mouth once daily.    aspirin (ECOTRIN) 81 MG EC tablet Take 1 tablet (81 mg total) by mouth once daily.    atorvastatin (LIPITOR) 10 MG tablet Take 10 mg by mouth once daily.    nitroGLYCERIN (NITROSTAT) 0.4 MG SL tablet Place 1 tablet (0.4 mg total) under the tongue every 5 (five) minutes as needed for Chest pain.    pantoprazole (PROTONIX) 40 MG tablet Take 40 mg by mouth once daily.    tadalafil (CIALIS) 5 MG tablet Take 5 mg by mouth daily as needed for Erectile Dysfunction.    tamsulosin (FLOMAX) 0.4 mg Cp24 Take 0.4 mg by mouth once daily.     No current facility-administered medications on file prior to visit.      Review of patient's allergies indicates:  No Known Allergies  ROS  Constitution: Positive for weight gain. Negative for weakness and malaise/fatigue.   HENT: Negative for headaches.   Eyes: Negative for blurred vision.   Cardiovascular: Positive for chest pain. Negative for claudication, cyanosis, dyspnea on exertion, irregular heartbeat, leg swelling, near-syncope, orthopnea, palpitations and paroxysmal nocturnal dyspnea.   Respiratory: Positive for shortness of breath and snoring. Negative for cough and hemoptysis.   Hematologic/Lymphatic: Negative for bleeding problem. Does not bruise/bleed easily.   Skin: Negative for dry skin and itching.   Musculoskeletal: Negative for falls, muscle weakness and myalgias.   Gastrointestinal: Negative for abdominal pain, diarrhea, heartburn, hematemesis, hematochezia and melena.   Genitourinary: Positive for nocturia. Negative for flank pain and hematuria.   Neurological: Negative for dizziness, focal weakness, light-headedness, numbness, paresthesias and  seizures.   Psychiatric/Behavioral: Negative for altered mental status and memory loss. The patient is not nervous/anxious.   Allergic/Immunologic: Negative for hives.   Objective:   Physical Exam  Vitals:    02/17/17 1009   BP: (!) 148/88   Pulse: 88   Weight: 115.3 kg (254 lb 3.1 oz)   Height: 6' (1.829 m)   Constitutional: He is oriented to person, place, and time. He appears well-developed and well-nourished. No distress.   HENT:   Head: Normocephalic and atraumatic.   Eyes: EOM are normal. Pupils are equal, round, and reactive to light. Right eye exhibits no discharge. Left eye exhibits no discharge.   Neck: Neck supple. No JVD present. No thyromegaly present.   Cardiovascular: Normal rate, regular rhythm, normal heart sounds and intact distal pulses. Exam reveals no gallop and no friction rub.   No murmur heard.  Pulmonary/Chest: Effort normal and breath sounds normal. No respiratory distress. He has no wheezes. He has no rales. He exhibits no tenderness.   Abdominal: Soft. Bowel sounds are normal. He exhibits no distension. There is no tenderness.   OBESE ABDOMEN   Musculoskeletal: Normal range of motion. He exhibits no edema.   Neurological: He is alert and oriented to person, place, and time. No cranial nerve deficit.   Skin: Skin is warm and dry. No rash noted. He is not diaphoretic. No erythema.   Psychiatric: He has a normal mood and affect. His behavior is normal.   Nursing note and vitals reviewed.  No results found for: CHOL  No results found for: HDL  No results found for: LDLCALC  No results found for: TRIG  No results found for: CHOLHDL    Chemistry    No results found for: NA, K, CL, CO2, BUN, CREATININE, GLU No results found for: CALCIUM, ALKPHOS, AST, ALT, BILITOT       No results found for: TSH  No results found for: INR, PROTIME  No results found for: WBC, HGB, HCT, MCV, PLT  BMP  No results found for: NA, K, CL, CO2, BUN, CREATININE, CALCIUM, ANIONGAP, ESTGFRAFRICA, EGFRNONAA  CrCl cannot be  calculated (Patient has no serum creatinine result on file.).    Assessment:     1. Chest pain syndrome    2. Hepatitis C virus infection without hepatic coma, unspecified chronicity    3. HIV (human immunodeficiency virus infection)    4. Hyperlipidemia, unspecified hyperlipidemia type      Atypical chest pain with multiple risk factors for cad continues to have chest pain will plan on angio to assure him he ahs no cad .    Plan:   [facility] via rt radial approach   I have explained the risks, benefits , and alternatives of the procedure in detail.the patient voices understanding and all questions have been answered.the patient agrees to proceed as planned.

## 2017-03-06 NOTE — PLAN OF CARE
Problem: Patient Care Overview  Goal: Individualization & Mutuality  Outcome: Ongoing (interventions implemented as appropriate)  Dg kwok at bedside [phone]

## 2017-03-06 NOTE — BRIEF OP NOTE
Date: 03/06/2017  Surgeon/Physician: Justa Dias MD  Assistants: Nataliia Manriquez    Pre Op Diagnosis: chest pain    Post OP Diagnosis: normal coronaries.    Procedure Performed:[facility]     ANESTHESIA:RN IV SEDATION    COMPLICATION: none    Specimen / Tissue Removed: No    Estimated Blood Loss: <50 cc    Prostetics/Devices: None    Findings / Operative Note:   Normal coronaries normal lvf.        PLAN:routine post cath care   reassure      Discharge Note  Short Stay      [facility]     Admit Date: 3/6/2017    Attending Physician: Ishmael Dias MD     Discharge Physician: Justa Dias MD     Discharge Date: 3/6/2017    Final Diagnosis: normal coronaries.    Outcome of Stay:patient tolerated procedure well no complications. He was deemed stable for discharge. He ahs normal coronaries. He needs w/u of non cardiac chest pain.    Disposition: Home or Self Care    Patient Instructions:   Current Discharge Medication List      CONTINUE these medications which have NOT CHANGED    Details   abacavir-dolutegravir-lamivud (TRIUMEQ) 600- mg Tab Take by mouth once daily.      amlodipine (NORVASC) 2.5 MG tablet Take 1 tablet (2.5 mg total) by mouth once daily.  Qty: 30 tablet, Refills: 11    Associated Diagnoses: Chest pain syndrome      aspirin (ECOTRIN) 81 MG EC tablet Take 1 tablet (81 mg total) by mouth once daily.  Qty: 30 tablet, Refills: 6    Associated Diagnoses: Chest pain syndrome      atorvastatin (LIPITOR) 10 MG tablet Take 10 mg by mouth once daily.      pantoprazole (PROTONIX) 40 MG tablet Take 40 mg by mouth once daily.      tadalafil (CIALIS) 5 MG tablet Take 5 mg by mouth daily as needed for Erectile Dysfunction.      tamsulosin (FLOMAX) 0.4 mg Cp24 Take 0.4 mg by mouth once daily.      nitroGLYCERIN (NITROSTAT) 0.4 MG SL tablet Place 1 tablet (0.4 mg total) under the tongue every 5 (five) minutes as needed for Chest pain.  Qty: 60 tablet, Refills: 12    Associated Diagnoses: Chest pain syndrome              Discharge Procedure Orders (must include Diet, Follow-up, Activity)    Discharge Procedure Orders (must include Diet, Follow-up, Activity)  Diet general     Activity as tolerated   Order Comments: As per post cath instructions     Call MD for:  temperature >100.4     Call MD for:  persistent nausea and vomiting     Call MD for:  severe uncontrolled pain     Call MD for:  difficulty breathing, headache or visual disturbances     Call MD for:  redness, tenderness, or signs of infection (pain, swelling, redness, odor or green/yellow discharge around incision site)     Call MD for:  hives     Call MD for:  persistent dizziness or light-headedness     Call MD for:  extreme fatigue       Follow-up Information     Follow up with KELSI Grullon In 1 week.    Specialty:  Cardiology    Contact information:    [address]  [place] [address] [phone]

## 2017-03-16 ENCOUNTER — OFFICE VISIT (OUTPATIENT)
Dept: CARDIOLOGY | Facility: CLINIC | Age: 52
End: 2017-03-16
Payer: COMMERCIAL

## 2017-03-16 VITALS
SYSTOLIC BLOOD PRESSURE: 130 MMHG | WEIGHT: 248 LBS | DIASTOLIC BLOOD PRESSURE: 70 MMHG | HEART RATE: 68 BPM | BODY MASS INDEX: 33.59 KG/M2 | HEIGHT: 72 IN

## 2017-03-16 DIAGNOSIS — R07.9 CHEST PAIN, UNSPECIFIED TYPE: Primary | ICD-10-CM

## 2017-03-16 DIAGNOSIS — E78.5 HYPERLIPIDEMIA, UNSPECIFIED HYPERLIPIDEMIA TYPE: ICD-10-CM

## 2017-03-16 PROCEDURE — 99999 PR PBB SHADOW E&M-EST. PATIENT-LVL III: CPT | Mod: PBBFAC,,, | Performed by: NURSE PRACTITIONER

## 2017-03-16 PROCEDURE — 99213 OFFICE O/P EST LOW 20 MIN: CPT | Mod: S$GLB,,, | Performed by: NURSE PRACTITIONER

## 2017-03-16 PROCEDURE — 1160F RVW MEDS BY RX/DR IN RCRD: CPT | Mod: S$GLB,,, | Performed by: NURSE PRACTITIONER

## 2017-03-16 NOTE — PROGRESS NOTES
Subjective:   Patient ID:  Semaj Allen is a 52 y.o. male who presents for follow up of Hospital Follow Up      HPI  Patient presents to clinic after undergoing angiogram for chest pain. He was noted to have normal coronaries. He hasn't had any chest discomfort since procedure. He is on on GERD medicine and recommendations have been made to workup noncardiac chest pain. He doesn't smoke or exercise. His lipids are being managed by his ID physician,Dr. Torres. Denies any right radial complaints post procedure. He feels fine.     Past Medical History:   Diagnosis Date    Allergic rhinitis     Chest pain syndrome 2/10/2017    Coronary artery disease     Depression     GERD (gastroesophageal reflux disease)     Hepatitis C     Hepatitis C 2/10/2017    Herpes simplex     HIV (human immunodeficiency virus infection) 2/10/2017    HIV disease     Hyperlipidemia     Hypertrophy of prostate with urinary obstruction and other lower urinary tract symptoms (LUTS)        Past Surgical History:   Procedure Laterality Date    CARDIAC CATHETERIZATION      jaw surgey      LEG SURGERY         Social History   Substance Use Topics    Smoking status: Former Smoker     Years: 10.00     Types: Cigarettes     Quit date: 1990    Smokeless tobacco: None    Alcohol use Yes      Comment: socially       Family History   Problem Relation Age of Onset    Heart attack Mother        Current Outpatient Prescriptions   Medication Sig    abacavir-dolutegravir-lamivud (TRIUMEQ) 600- mg Tab Take by mouth once daily.    aspirin (ECOTRIN) 81 MG EC tablet Take 1 tablet (81 mg total) by mouth once daily.    atorvastatin (LIPITOR) 10 MG tablet Take 10 mg by mouth once daily.    pantoprazole (PROTONIX) 40 MG tablet Take 40 mg by mouth once daily.    tadalafil (CIALIS) 5 MG tablet Take 5 mg by mouth daily as needed for Erectile Dysfunction.    tamsulosin (FLOMAX) 0.4 mg Cp24 Take 0.4 mg by mouth once daily.    amlodipine  (NORVASC) 2.5 MG tablet Take 1 tablet (2.5 mg total) by mouth once daily.    nitroGLYCERIN (NITROSTAT) 0.4 MG SL tablet Place 1 tablet (0.4 mg total) under the tongue every 5 (five) minutes as needed for Chest pain.     No current facility-administered medications for this visit.      Current Outpatient Prescriptions on File Prior to Visit   Medication Sig    abacavir-dolutegravir-lamivud (TRIUMEQ) 600- mg Tab Take by mouth once daily.    aspirin (ECOTRIN) 81 MG EC tablet Take 1 tablet (81 mg total) by mouth once daily.    atorvastatin (LIPITOR) 10 MG tablet Take 10 mg by mouth once daily.    pantoprazole (PROTONIX) 40 MG tablet Take 40 mg by mouth once daily.    tadalafil (CIALIS) 5 MG tablet Take 5 mg by mouth daily as needed for Erectile Dysfunction.    tamsulosin (FLOMAX) 0.4 mg Cp24 Take 0.4 mg by mouth once daily.    amlodipine (NORVASC) 2.5 MG tablet Take 1 tablet (2.5 mg total) by mouth once daily.    nitroGLYCERIN (NITROSTAT) 0.4 MG SL tablet Place 1 tablet (0.4 mg total) under the tongue every 5 (five) minutes as needed for Chest pain.     No current facility-administered medications on file prior to visit.        Review of Systems   Constitution: Negative for decreased appetite, weakness, malaise/fatigue, weight gain and weight loss.   HENT: Negative for nosebleeds.    Cardiovascular: Negative for chest pain, claudication, dyspnea on exertion, irregular heartbeat, leg swelling, near-syncope, orthopnea, palpitations, paroxysmal nocturnal dyspnea and syncope.   Respiratory: Negative for cough, shortness of breath, sleep disturbances due to breathing, snoring and wheezing.    Hematologic/Lymphatic: Negative for bleeding problem. Does not bruise/bleed easily.   Skin: Negative for rash.   Musculoskeletal: Negative for arthritis, back pain, falls, joint pain, joint swelling, muscle cramps, muscle weakness and myalgias.   Gastrointestinal: Negative for bloating, abdominal pain, constipation,  diarrhea, heartburn, nausea and vomiting.   Genitourinary: Negative for dysuria, hematuria and nocturia.   Neurological: Negative for excessive daytime sleepiness, dizziness, light-headedness, loss of balance, numbness, paresthesias and vertigo.       Objective:   Physical Exam   Constitutional: He is oriented to person, place, and time. He appears well-developed and well-nourished.   Neck: Neck supple. No JVD present.   Cardiovascular: Normal rate, regular rhythm, normal heart sounds and normal pulses.  Exam reveals no friction rub.    No murmur heard.  Pulmonary/Chest: Effort normal and breath sounds normal. No respiratory distress. He has no wheezes. He has no rales.   Abdominal: Soft. Bowel sounds are normal. He exhibits no distension.   Musculoskeletal: He exhibits no edema or tenderness.   Neurological: He is alert and oriented to person, place, and time.   Skin: Skin is warm and dry. No rash noted.   Right radial access site without redness, tenderness, swelling, or drainage. There is no active external bleeding or hematoma.    Psychiatric: He has a normal mood and affect. His behavior is normal.   Nursing note and vitals reviewed.    Vitals:    03/16/17 1638   BP: 130/70   Pulse: 68   Weight: 112.5 kg (248 lb 0.3 oz)   Height: 6' (1.829 m)     No results found for: CHOL  No results found for: HDL  No results found for: LDLCALC  No results found for: TRIG  No results found for: CHOLHDL    Chemistry        Component Value Date/Time     02/17/2017 1110    K 4.0 02/17/2017 1110     02/17/2017 1110    CO2 26 02/17/2017 1110    BUN 15 02/17/2017 1110    CREATININE 1.3 02/17/2017 1110    GLU 93 02/17/2017 1110        Component Value Date/Time    CALCIUM 9.3 02/17/2017 1110          No results found for: TSH  Lab Results   Component Value Date    INR 1.0 02/17/2017     Lab Results   Component Value Date    WBC 8.68 02/17/2017    HGB 14.6 02/17/2017    HCT 43.0 02/17/2017    MCV 91 02/17/2017      02/17/2017     BMP  Sodium   Date Value Ref Range Status   02/17/2017 140 136 - 145 mmol/L Final     Potassium   Date Value Ref Range Status   02/17/2017 4.0 3.5 - 5.1 mmol/L Final     Chloride   Date Value Ref Range Status   02/17/2017 106 95 - 110 mmol/L Final     CO2   Date Value Ref Range Status   02/17/2017 26 23 - 29 mmol/L Final     BUN, Bld   Date Value Ref Range Status   02/17/2017 15 6 - 20 mg/dL Final     Creatinine   Date Value Ref Range Status   02/17/2017 1.3 0.5 - 1.4 mg/dL Final     Calcium   Date Value Ref Range Status   02/17/2017 9.3 8.7 - 10.5 mg/dL Final     Anion Gap   Date Value Ref Range Status   02/17/2017 8 8 - 16 mmol/L Final     eGFR if    Date Value Ref Range Status   02/17/2017 >60.0 >60 mL/min/1.73 m^2 Final     eGFR if non    Date Value Ref Range Status   02/17/2017 >60.0 >60 mL/min/1.73 m^2 Final     Comment:     Calculation used to obtain the estimated glomerular filtration  rate (eGFR) is the CKD-EPI equation. Since race is unknown   in our information system, the eGFR values for   -American and Non--American patients are given   for each creatinine result.       CrCl cannot be calculated (Patient has no serum creatinine result on file.).    Assessment:     1. Chest pain, unspecified type    2. Hyperlipidemia, unspecified hyperlipidemia type      Normal coronaries on angiogram  No chest pain since procedure   BP stable  Lipids being managed by Dr. Torres     Plan:   Continue current medical management  Refer to GI for noncardiac chest pain  Risk factor modification  exercise program and weight loss  RTC PRN

## 2017-03-16 NOTE — MR AVS SNAPSHOT
[facility] - Cardiology  [address] [facility] [place] [address]  Phone: [phone]  Fax: [phone]                  Semaj Allen   3/16/2017 4:30 PM   Office Visit    Description:  Male : 1965   Provider:  KELSI Grullon   Department:  [facility] - [facility]           Reason for Visit     Hospital Follow Up           Diagnoses this Visit        Comments    Chest pain, unspecified type    -  Primary     Hyperlipidemia, unspecified hyperlipidemia type                To Do List           Goals (5 Years of Data)     None      Follow-Up and Disposition     Return if symptoms worsen or fail to improve.      [facility]     [facility] Care [facility] -  Assistance  Registered nurses in the [facility] provide clinical advisement, health education, appointment booking, and other advisory services.  Call for this free service at [phone].             Medications           Message regarding Medications     Verify the changes and/or additions to your medication regime listed below are the same as discussed with your clinician today.  If any of these changes or additions are incorrect, please notify your healthcare provider.             Verify that the below list of medications is an accurate representation of the medications you are currently taking.  If none reported, the list may be blank. If incorrect, please contact your healthcare provider. Carry this list with you in case of emergency.           Current Medications     abacavir-dolutegravir-lamivud (TRIUMEQ) 600- mg Tab Take by mouth once daily.    aspirin (ECOTRIN) 81 MG EC tablet Take 1 tablet (81 mg total) by mouth once daily.    atorvastatin (LIPITOR) 10 MG tablet Take 10 mg by mouth once daily.    pantoprazole (PROTONIX) 40 MG tablet Take 40 mg by mouth once daily.    tadalafil (CIALIS) 5 MG tablet Take 5 mg by mouth daily as needed for Erectile Dysfunction.    tamsulosin (FLOMAX) 0.4 mg Cp24 Take 0.4 mg by  mouth once daily.    amlodipine (NORVASC) 2.5 MG tablet Take 1 tablet (2.5 mg total) by mouth once daily.    nitroGLYCERIN (NITROSTAT) 0.4 MG SL tablet Place 1 tablet (0.4 mg total) under the tongue every 5 (five) minutes as needed for Chest pain.           Clinical Reference Information           Your Vitals Were     BP Pulse Height Weight BMI    130/70 68 6' (1.829 m) 112.5 kg (248 lb 0.3 oz) 33.64 kg/m2      Blood Pressure          Most Recent Value    Right Arm BP - Sitting  130/70    Left Arm BP - Sitting  126/62    BP  130/70      Allergies as of 3/16/2017     No Known Allergies      Immunizations Administered on Date of Encounter - 3/16/2017     None      Orders Placed During Today's Visit      Normal Orders This Visit    Ambulatory consult to Gastroenterology       [employer] Sign-Up     Activating your [facility] account is as easy as 1-2-3!     1) Visit [email or web address], select Sign Up Now, enter this activation code and your date of birth, then select Next.  [email or web address]  Expires: 3/27/2017  2:05 PM      2) Create a username and password to use when you visit [facility] in the future and select a security question in case you lose your password and select Next.    3) Enter your e-mail address and click Sign Up!    Additional Information  If you have questions, please e-mail [email or web address] or call [phone] to talk to our [facility] staff. Remember, [facility] is NOT to be used for urgent needs. For medical emergencies, dial 911.         Language Assistance Services     ATTENTION: Language assistance services are available, free of charge. Please call [phone].      ATENCIÓN: Si habla español, tiene a garcia disposición servicios gratuitos de asistencia lingüística. Llame al [phone].     CHÚ Ý: N?u b?n nói Ti?ng Vi?t, có các d?ch v? h? tr? ngôn ng? mi?n phí dành cho b?n. G?i s? [phone].         [facility] complies with applicable Federal civil rights laws and does not  discriminate on the basis of race, color, national origin, age, disability, or sex.

## 2017-03-20 ENCOUNTER — OFFICE VISIT (OUTPATIENT)
Dept: GASTROENTEROLOGY | Facility: CLINIC | Age: 52
End: 2017-03-20
Payer: COMMERCIAL

## 2017-03-20 VITALS
HEART RATE: 68 BPM | BODY MASS INDEX: 34.38 KG/M2 | WEIGHT: 253.5 LBS | DIASTOLIC BLOOD PRESSURE: 70 MMHG | SYSTOLIC BLOOD PRESSURE: 132 MMHG

## 2017-03-20 DIAGNOSIS — K59.00 CONSTIPATION, UNSPECIFIED CONSTIPATION TYPE: ICD-10-CM

## 2017-03-20 DIAGNOSIS — K21.9 GASTROESOPHAGEAL REFLUX DISEASE, ESOPHAGITIS PRESENCE NOT SPECIFIED: ICD-10-CM

## 2017-03-20 DIAGNOSIS — R10.12 LUQ ABDOMINAL PAIN: Primary | ICD-10-CM

## 2017-03-20 PROCEDURE — 99999 PR PBB SHADOW E&M-EST. PATIENT-LVL III: CPT | Mod: PBBFAC,,, | Performed by: NURSE PRACTITIONER

## 2017-03-20 PROCEDURE — 1160F RVW MEDS BY RX/DR IN RCRD: CPT | Mod: S$GLB,,, | Performed by: NURSE PRACTITIONER

## 2017-03-20 PROCEDURE — 99204 OFFICE O/P NEW MOD 45 MIN: CPT | Mod: S$GLB,,, | Performed by: NURSE PRACTITIONER

## 2017-03-20 NOTE — LETTER
March 20, 2017      KELSI Grullon  [address] [place] [address]           [facility]  [address]  [place] [address]  Phone: [phone]  Fax: [phone]          Patient: Semaj Allen   MR Number: [id number]   YOB: 1965   Date of Visit: 3/20/2017       Dear Victorino Ross:    Thank you for referring Semaj Allen to me for evaluation. Attached you will find relevant portions of my assessment and plan of care.    If you have questions, please do not hesitate to call me. I look forward to following Semaj Allen along with you.    Sincerely,    Elizabeth Nix, KELSI    Enclosure  CC:  No Recipients    If you would like to receive this communication electronically, please contact [email or web address] or [phone] to request more information on [employer] access.    For providers and/or their staff who would like to refer a patient to [facility], please contact us through our one-stop-shop provider referral line, Myra Rogers, at [phone].    If you feel you have received this communication in error or would no longer like to receive these types of communications, please e-mail [email or web address]

## 2017-03-20 NOTE — MR AVS SNAPSHOT
[facility]  [address] [facility] [address]  Phone: [phone]  Fax: [phone]                  Semaj Zhangrd   3/20/2017 1:00 PM   Office Visit    Description:  Male : 1965   Provider:  KELSI Daniel   Department:  [facility]           Reason for Visit     Gastroesophageal Reflux           Diagnoses this Visit        Comments    LUQ abdominal pain    -  Primary     Gastroesophageal reflux disease, esophagitis presence not specified         Constipation, unspecified constipation type                To Do List           Future Appointments        Provider Department Dept Phone    2017 1:00 PM KELSI Daniel [facility] G[facility]enter[facility] [phone]      Your Future Surgeries/Procedures     Mar 24, 2017   Surgery with Nathan Guerra III, MD   [facility]    [address] [facility] [address] [phone]              Goals (5 Years of Data)     None      Follow-Up and Disposition     Return in about 4 weeks (around 2017).      [facility]     [facility] Care [facility] -  Assistance  Registered nurses in the [facility] provide clinical advisement, health education, appointment booking, and other advisory services.  Call for this free service at [phone].             Medications           Message regarding Medications     Verify the changes and/or additions to your medication regime listed below are the same as discussed with your clinician today.  If any of these changes or additions are incorrect, please notify your healthcare provider.             Verify that the below list of medications is an accurate representation of the medications you are currently taking.  If none reported, the list may be blank. If incorrect, please contact your healthcare provider. Carry this list with you in case of emergency.           Current Medications      abacavir-dolutegravir-lamivud (TRIUMEQ) 600- mg Tab Take by mouth once daily.    aspirin (ECOTRIN) 81 MG EC tablet Take 1 tablet (81 mg total) by mouth once daily.    atorvastatin (LIPITOR) 10 MG tablet Take 10 mg by mouth once daily.    pantoprazole (PROTONIX) 40 MG tablet Take 40 mg by mouth once daily.    tadalafil (CIALIS) 5 MG tablet Take 5 mg by mouth daily as needed for Erectile Dysfunction.    tamsulosin (FLOMAX) 0.4 mg Cp24 Take 0.4 mg by mouth once daily.    amlodipine (NORVASC) 2.5 MG tablet Take 1 tablet (2.5 mg total) by mouth once daily.    nitroGLYCERIN (NITROSTAT) 0.4 MG SL tablet Place 1 tablet (0.4 mg total) under the tongue every 5 (five) minutes as needed for Chest pain.           Clinical Reference Information           Your Vitals Were     BP Pulse Weight BMI       132/70 68 115 kg (253 lb 8.5 oz) 34.38 kg/m2       Blood Pressure          Most Recent Value    BP  132/70      Allergies as of 3/20/2017     No Known Allergies      Immunizations Administered on Date of Encounter - 3/20/2017     None      Orders Placed During Today's Visit      Normal Orders This Visit    Case request GI: ESOPHAGOGASTRODUODENOSCOPY (EGD)       [facility] Sign-Up     Activating your [facility] account is as easy as 1-2-3!     1) Visit [email or web address], select Sign Up Now, enter this activation code and your date of birth, then select Next.  [email or web address]  Expires: 3/27/2017  2:05 PM      2) Create a username and password to use when you visit [facility] in the future and select a security question in case you lose your password and select Next.    3) Enter your e-mail address and click Sign Up!    Additional Information  If you have questions, please e-mail [email or web address] or call [phone] to talk to our [facility] staff. Remember, [facility] is NOT to be used for urgent needs. For medical emergencies, dial 911.         Instructions    Start Miralax once daily.       Language Assistance Services      ATTENTION: Language assistance services are available, free of charge. Please call [phone].      ATENCIÓN: Si habla español, tiene a garcia disposición servicios gratuitos de asistencia lingüística. Llame al [phone].     CHÚ Ý: N?u b?n nói Ti?ng Vi?t, có các d?ch v? h? tr? ngôn ng? mi?n phí dành cho b?n. G?i s? [phone].         [facility] complies with applicable Federal civil rights laws and does not discriminate on the basis of race, color, national origin, age, disability, or sex.

## 2017-03-20 NOTE — PROGRESS NOTES
Clinic Consult:  [facility] Consultation [facility]    Reason for Consult:  The primary encounter diagnosis was LUQ abdominal pain. Diagnoses of Gastroesophageal reflux disease, esophagitis presence not specified and Constipation, unspecified constipation type were also pertinent to this visit.    PCP: Sigrid Torres   [address] / [place] [address]    HPI:  This is a 52 y.o. male here for evaluation of the above  He reports that over the last few weeks, he has had progressively worsening, intermittent LUQ abdominal pain.  He describes the pain as sharp.  Worse after meals.  Pain will occasionally radiate upwards into the chest.  He previously had a cardiac workup which was negative.   He denies any nausea or vomiting.  No melena or hematochezia.    He admits to chronic constipation for most of his life.  He has not tried any medications for the constipation.  He has a hard BM with straining 1-2 times per week.     Review of Systems   Constitutional: Negative for chills, fever, malaise/fatigue and weight loss.   Respiratory: Negative for cough.    Cardiovascular: Negative for chest pain.   Gastrointestinal:        Per HPI   Musculoskeletal: Negative for myalgias.   Skin: Negative for itching and rash.   Neurological: Negative for headaches.   Psychiatric/Behavioral: The patient is not nervous/anxious.        Medical History:   Past Medical History:   Diagnosis Date    Allergic rhinitis     Chest pain syndrome 2/10/2017    Coronary artery disease     Depression     GERD (gastroesophageal reflux disease)     Hepatitis C     Hepatitis C 2/10/2017    Herpes simplex     HIV (human immunodeficiency virus infection) 2/10/2017    HIV disease     Hyperlipidemia     Hypertrophy of prostate with urinary obstruction and other lower urinary tract symptoms (LUTS)        Surgical History:  Past Surgical History:   Procedure Laterality Date    CARDIAC CATHETERIZATION      jaw surgey      LEG SURGERY          Family History:   Family History   Problem Relation Age of Onset    Heart attack Mother        Social History:   Social History   Substance Use Topics    Smoking status: Former Smoker     Years: 10.00     Types: Cigarettes     Quit date: 1990    Smokeless tobacco: Never Used    Alcohol use Yes      Comment: socially       Allergies: Reviewed    Home Medications:   Current Outpatient Prescriptions on File Prior to Visit   Medication Sig Dispense Refill    abacavir-dolutegravir-lamivud (TRIUMEQ) 600- mg Tab Take by mouth once daily.      aspirin (ECOTRIN) 81 MG EC tablet Take 1 tablet (81 mg total) by mouth once daily. 30 tablet 6    atorvastatin (LIPITOR) 10 MG tablet Take 10 mg by mouth once daily.      pantoprazole (PROTONIX) 40 MG tablet Take 40 mg by mouth once daily.      tadalafil (CIALIS) 5 MG tablet Take 5 mg by mouth daily as needed for Erectile Dysfunction.      tamsulosin (FLOMAX) 0.4 mg Cp24 Take 0.4 mg by mouth once daily.      amlodipine (NORVASC) 2.5 MG tablet Take 1 tablet (2.5 mg total) by mouth once daily. 30 tablet 11    nitroGLYCERIN (NITROSTAT) 0.4 MG SL tablet Place 1 tablet (0.4 mg total) under the tongue every 5 (five) minutes as needed for Chest pain. 60 tablet 12     No current facility-administered medications on file prior to visit.        Physical Exam:  Vital Signs:  /70  Pulse 68  Wt 115 kg (253 lb 8.5 oz)  BMI 34.38 kg/m2  Body mass index is 34.38 kg/(m^2).  Physical Exam   Constitutional: He is oriented to person, place, and time. He appears well-developed and well-nourished.   HENT:   Head: Normocephalic.   Eyes: No scleral icterus.   Neck: Normal range of motion.   Cardiovascular: Normal rate and regular rhythm.    Pulmonary/Chest: Effort normal and breath sounds normal.   Abdominal: Soft. Bowel sounds are normal. He exhibits no distension. There is no tenderness.   Musculoskeletal: Normal range of motion.   Neurological: He is alert and oriented to  person, place, and time.   Skin: Skin is warm and dry.   Psychiatric: He has a normal mood and affect.   Vitals reviewed.      Labs: Pertinent labs reviewed.      Assessment:  1. LUQ abdominal pain    2. Gastroesophageal reflux disease, esophagitis presence not specified    3. Constipation, unspecified constipation type         Recommendations:  LUQ abdominal pain  Gastroesophageal reflux disease, esophagitis presence not specified  - GERD vs H. Pylori vs PUD vs possible colon spasms given his constipation  -     Case request GI: ESOPHAGOGASTRODUODENOSCOPY (EGD)    Constipation, unspecified constipation type  - Start Miralax once daily  - ? If abdominal pain is more related to this vs gastritis issues.    - May need Linzess or Amitiza if Miralax is not effective.         Return in about 4 weeks (around 4/17/2017).        Thank you so much for allowing me to participate in the care of DEACON Ramon

## 2017-03-24 ENCOUNTER — ANESTHESIA (OUTPATIENT)
Dept: ENDOSCOPY | Facility: HOSPITAL | Age: 52
End: 2017-03-24
Payer: COMMERCIAL

## 2017-03-24 ENCOUNTER — ANESTHESIA EVENT (OUTPATIENT)
Dept: ENDOSCOPY | Facility: HOSPITAL | Age: 52
End: 2017-03-24
Payer: COMMERCIAL

## 2017-03-24 ENCOUNTER — HOSPITAL ENCOUNTER (OUTPATIENT)
Facility: HOSPITAL | Age: 52
Discharge: HOME | End: 2017-03-24
Attending: INTERNAL MEDICINE | Admitting: INTERNAL MEDICINE
Payer: COMMERCIAL

## 2017-03-24 ENCOUNTER — SURGERY (OUTPATIENT)
Age: 52
End: 2017-03-24

## 2017-03-24 VITALS
OXYGEN SATURATION: 95 % | DIASTOLIC BLOOD PRESSURE: 77 MMHG | SYSTOLIC BLOOD PRESSURE: 136 MMHG | RESPIRATION RATE: 18 BRPM | HEIGHT: 72 IN | HEART RATE: 64 BPM | TEMPERATURE: 98 F | BODY MASS INDEX: 33.46 KG/M2 | WEIGHT: 247 LBS

## 2017-03-24 DIAGNOSIS — K29.30 SUPERFICIAL GASTRITIS WITHOUT HEMORRHAGE, UNSPECIFIED CHRONICITY: ICD-10-CM

## 2017-03-24 DIAGNOSIS — B20 HIV (HUMAN IMMUNODEFICIENCY VIRUS INFECTION): ICD-10-CM

## 2017-03-24 DIAGNOSIS — R10.12 LUQ ABDOMINAL PAIN: ICD-10-CM

## 2017-03-24 DIAGNOSIS — R07.9 CHEST PAIN SYNDROME: ICD-10-CM

## 2017-03-24 DIAGNOSIS — K31.7 GASTRIC POLYPS: Primary | ICD-10-CM

## 2017-03-24 PROCEDURE — 63600175 PHARM REV CODE 636 W HCPCS: Performed by: NURSE ANESTHETIST, CERTIFIED REGISTERED

## 2017-03-24 PROCEDURE — 37000009 HC ANESTHESIA EA ADD 15 MINS: Performed by: INTERNAL MEDICINE

## 2017-03-24 PROCEDURE — 88305 TISSUE EXAM BY PATHOLOGIST: CPT | Mod: 26,,, | Performed by: PATHOLOGY

## 2017-03-24 PROCEDURE — 27201012 HC FORCEPS, HOT/COLD, DISP: Performed by: INTERNAL MEDICINE

## 2017-03-24 PROCEDURE — 25000003 PHARM REV CODE 250: Performed by: INTERNAL MEDICINE

## 2017-03-24 PROCEDURE — 25000003 PHARM REV CODE 250: Performed by: NURSE ANESTHETIST, CERTIFIED REGISTERED

## 2017-03-24 PROCEDURE — 88305 TISSUE EXAM BY PATHOLOGIST: CPT | Performed by: PATHOLOGY

## 2017-03-24 PROCEDURE — 43239 EGD BIOPSY SINGLE/MULTIPLE: CPT | Mod: ,,, | Performed by: INTERNAL MEDICINE

## 2017-03-24 PROCEDURE — 37000008 HC ANESTHESIA 1ST 15 MINUTES: Performed by: INTERNAL MEDICINE

## 2017-03-24 PROCEDURE — 43239 EGD BIOPSY SINGLE/MULTIPLE: CPT | Performed by: INTERNAL MEDICINE

## 2017-03-24 RX ORDER — SODIUM CHLORIDE, SODIUM LACTATE, POTASSIUM CHLORIDE, CALCIUM CHLORIDE 600; 310; 30; 20 MG/100ML; MG/100ML; MG/100ML; MG/100ML
INJECTION, SOLUTION INTRAVENOUS CONTINUOUS
Status: DISCONTINUED | OUTPATIENT
Start: 2017-03-24 | End: 2017-03-24 | Stop reason: HOSPADM

## 2017-03-24 RX ORDER — SODIUM CHLORIDE, SODIUM LACTATE, POTASSIUM CHLORIDE, CALCIUM CHLORIDE 600; 310; 30; 20 MG/100ML; MG/100ML; MG/100ML; MG/100ML
INJECTION, SOLUTION INTRAVENOUS CONTINUOUS PRN
Status: DISCONTINUED | OUTPATIENT
Start: 2017-03-24 | End: 2017-03-24

## 2017-03-24 RX ORDER — PROPOFOL 10 MG/ML
INJECTION, EMULSION INTRAVENOUS
Status: DISCONTINUED | OUTPATIENT
Start: 2017-03-24 | End: 2017-03-24

## 2017-03-24 RX ORDER — LIDOCAINE HCL/PF 100 MG/5ML
SYRINGE (ML) INTRAVENOUS
Status: DISCONTINUED | OUTPATIENT
Start: 2017-03-24 | End: 2017-03-24

## 2017-03-24 RX ADMIN — SODIUM CHLORIDE, SODIUM LACTATE, POTASSIUM CHLORIDE, AND CALCIUM CHLORIDE: 600; 310; 30; 20 INJECTION, SOLUTION INTRAVENOUS at 06:03

## 2017-03-24 RX ADMIN — LIDOCAINE HYDROCHLORIDE 75 MG: 20 INJECTION, SOLUTION INTRAVENOUS at 07:03

## 2017-03-24 RX ADMIN — PROPOFOL 30 MG: 10 INJECTION, EMULSION INTRAVENOUS at 07:03

## 2017-03-24 RX ADMIN — SODIUM CHLORIDE, SODIUM LACTATE, POTASSIUM CHLORIDE, AND CALCIUM CHLORIDE: .6; .31; .03; .02 INJECTION, SOLUTION INTRAVENOUS at 06:03

## 2017-03-24 RX ADMIN — PROPOFOL 140 MG: 10 INJECTION, EMULSION INTRAVENOUS at 07:03

## 2017-03-24 NOTE — IP AVS SNAPSHOT
[address] [place] [address]           Patient Discharge Instructions     Our goal is to set you up for success. This packet includes information on your condition, medications, and your home care. It will help you to care for yourself so you don't get sicker and need to go back to the hospital.     Please ask your nurse if you have any questions.        There are many details to remember when preparing to leave the hospital. Here is what you will need to do:    1. Take your medicine. If you are prescribed medications, review your Medication List in the following pages. You may have new medications to  at the pharmacy and others that you'll need to stop taking. Review the instructions for how and when to take your medications. Talk with your doctor or nurses if you are unsure of what to do.     2. Go to your follow-up appointments. Specific follow-up information is listed in the following pages. Your may be contacted by a transition nurse or clinical provider about future appointments. Be sure we have all of the phone numbers to reach you, if needed. Please contact your provider's office if you are unable to make an appointment.     3. Watch for warning signs. Your doctor or nurse will give you detailed warning signs to watch for and when to call for assistance. These instructions may also include educational information about your condition. If you experience any of warning signs to your health, call your doctor.               ** Verify the list of medication(s) below is accurate and up to date. Carry this with you in case of emergency. If your medications have changed, please notify your healthcare provider.             Medication List      CONTINUE taking these medications        Additional Info                      amlodipine 2.5 MG tablet   Commonly known as:  NORVASC   Quantity:  30 tablet   Refills:  11   Dose:  2.5 mg    Instructions:  Take 1 tablet  (2.5 mg total) by mouth once daily.     Begin Date    AM    Noon    PM    Bedtime       aspirin 81 MG EC tablet   Commonly known as:  ECOTRIN   Quantity:  30 tablet   Refills:  6   Dose:  81 mg    Instructions:  Take 1 tablet (81 mg total) by mouth once daily.     Begin Date    AM    Noon    PM    Bedtime       atorvastatin 10 MG tablet   Commonly known as:  LIPITOR   Refills:  0   Dose:  10 mg    Instructions:  Take 10 mg by mouth once daily.     Begin Date    AM    Noon    PM    Bedtime       nitroGLYCERIN 0.4 MG SL tablet   Commonly known as:  NITROSTAT   Quantity:  60 tablet   Refills:  12   Dose:  0.4 mg    Instructions:  Place 1 tablet (0.4 mg total) under the tongue every 5 (five) minutes as needed for Chest pain.     Begin Date    AM    Noon    PM    Bedtime       pantoprazole 40 MG tablet   Commonly known as:  PROTONIX   Refills:  0   Dose:  40 mg    Instructions:  Take 40 mg by mouth once daily.     Begin Date    AM    Noon    PM    Bedtime       tadalafil 5 MG tablet   Commonly known as:  CIALIS   Refills:  0   Dose:  5 mg    Instructions:  Take 5 mg by mouth daily as needed for Erectile Dysfunction.     Begin Date    AM    Noon    PM    Bedtime       tamsulosin 0.4 mg Cp24   Commonly known as:  FLOMAX   Refills:  0   Dose:  0.4 mg    Instructions:  Take 0.4 mg by mouth once daily.     Begin Date    AM    Noon    PM    Bedtime       TRIUMEQ 600- mg Tab   Refills:  0   Generic drug:  abacavir-dolutegravir-lamivud    Instructions:  Take by mouth once daily.     Begin Date    AM    Noon    PM    Bedtime                  Please bring to all follow up appointments:    1. A copy of your discharge instructions.  2. All medicines you are currently taking in their original bottles.  3. Identification and insurance card.    Please arrive 15 minutes ahead of scheduled appointment time.    Please call 24 hours in advance if you must reschedule your appointment and/or time.        Your Scheduled Appointments      Apr 17, 2017  1:00 PM CDT   GASTROENTEROLOGY ESTABLISHED PATIENT with KELSI Daniel   [facility]    [address] [facility] [place] [id number] [phone]                Discharge Instructions     Future Orders    Activity as tolerated     Diet general     Questions:    Total calories:      Fat restriction, if any:      Protein restriction, if any:      Na restriction, if any:      Fluid restriction:      Additional restrictions:          Discharge Instructions         Gastritis (Adult)    Gastritis is inflammation and irritation of the stomach lining. It can be present for a short time (acute) or be long lasting (chronic). Gastritis is often caused by infection with bacteria called H pylori. More than a third of people in the [place] have this bacteria in their bodies. In many cases, H pylori causes no problems or symptoms. In some people, though, the infection irritates the stomach lining and causes gastritis. Other causes of stomach irritation include drinking alcohol or taking pain-relieving medicines called NSAIDs (such as aspirin or ibuprofen).   Symptoms of gastritis can include:  · Abdominal pain or bloating  · Loss of appetite  · Nausea or vomiting  · Vomiting blood or having black stools  · Feeling more tired than usual  An inflamed and irritated stomach lining is more likely to develop a sore called an ulcer. To help prevent this, gastritis should be treated.  Home care  If needed, medicines may be prescribed. If you have H pylori infection, treating it will likely relieve your symptoms. Other changes can help reduce stomach irritation and help it heal.  · If you have been prescribed medicines for H pylori infection, take them as directed. Take all of the medicine until it is finished or your healthcare provider tells you to stop, even if you feel better.  · Your healthcare provider may recommend avoiding NSAIDs. If you take daily aspirin for your heart or other medical reasons, do  not stop without talking to your healthcare provider first.  · Avoid drinking alcohol.  · Stop smoking. Smoking can irritate the stomach and delay healing. As much as possible, stay away from second hand smoke.  Follow-up care  Follow up with your healthcare provider, or as advised by our staff. Testing may be needed to check for inflammation or an ulcer.  When to seek medical advice  Call your healthcare provider for any of the following:  · Stomach pain that gets worse or moves to the lower right abdomen (appendix area)  · Chest pain that appears or gets worse, or spreads to the back, neck, shoulder, or arm  · Frequent vomiting (cant keep down liquids)  · Blood in the stool or vomit (red or black in color)  · Feeling weak or dizzy  · Fever of 100.4ºF (38ºC) or higher, or as directed by your healthcare provider  Date Last Reviewed: 6/22/2015  © 7682-3867 [employer]. [address] [place] [address]. All rights reserved. This information is not intended as a substitute for professional medical care. Always follow your healthcare professional's instructions.            Admission Information     Date & Time Provider Department CSN    3/24/2017  6:00 AM Nathan Guerra III, MD [facility]  [id number]      Care Providers     Provider Role Specialty Primary office phone    Nathan Guerra III, MD Attending Provider Gastroenterology [phone]    Nathan Guerra III, MD Surgeon  Gastroenterology [phone]      Your Vitals Were     BP Pulse Temp Resp Height Weight    133/73 (BP Location: Right leg, Patient Position: Lying, BP Method: Automatic) 73 97.5 °F (36.4 °C) (Oral) 18 6' (1.829 m) 112 kg (247 lb)    SpO2 BMI             94% 33.5 kg/m2         Recent Lab Values     No lab values to display.      Pending Labs     Order Current Status    Specimen to Pathology - Surgery Collected (03/24/17 3390)      Allergies as of 3/24/2017     No Known Allergies      [facility]      [facility] Care [facility] - 24/7 Assistance  Unless otherwise directed by your provider, please contact [facility], our nurse care line that is available for 24/7 assistance.     Registered nurses in the [facility] provide clinical advisement, health education, appointment booking, and other advisory services.  Call for this free service at [phone].        Advance Directives     An advance directive is a document which, in the event you are no longer able to make decisions for yourself, tells your healthcare team what kind of treatment you do or do not want to receive, or who you would like to make those decisions for you.  If you do not currently have an advance directive, [facility] encourages you to create one.  For more information call:  [phone]), [phone] ([phone]),  or log on to [email or web address].        Smoking Cessation     If you would like to quit smoking:   You may be eligible for free services if you are a [place] resident and started smoking cigarettes before September 1, 1988.  Call the Smoking Cessation Trust (SCT) toll free at (365) 324-6690 or (643) 908-1119.   Call 6-908-QUIT-NOW if you do not meet the above criteria.            Language Assistance Services     ATTENTION: Language assistance services are available, free of charge. Please call [phone].      ATENCIÓN: Si habla español, tiene a garcia disposición servicios gratuitos de asistencia lingüística. Llame al [phone].     CHÚ Ý: N?u b?n nói Ti?ng Vi?t, có các d?ch v? h? tr? ngôn ng? mi?n phí dành cho b?n. G?i s? [phone].        [facility] Sign-Up     Activating your [facility] account is as easy as 1-2-3!     1) Visit [email or web address], select Sign Up Now, enter this activation code and your date of birth, then select Next.  [email or web address]  Expires: 3/27/2017  2:05 PM      2) Create a username and password to use when you visit [facility] in the future and select  a security question in case you lose your password and select Next.    3) Enter your e-mail address and click Sign Up!    Additional Information  If you have questions, please e-mail [email or web address] or call [phone] to talk to our [facility] staff. Remember, [facility] is NOT to be used for urgent needs. For medical emergencies, dial 911.          [facility] complies with applicable Federal civil rights laws and does not discriminate on the basis of race, color, national origin, age, disability, or sex.

## 2017-03-24 NOTE — TRANSFER OF CARE
Anesthesia Transfer of Care Note    Patient: Semaj Allen    Procedure(s) Performed: Procedure(s) (LRB):  ESOPHAGOGASTRODUODENOSCOPY (EGD) (N/A)    Patient location: PACU    Anesthesia Type: MAC    Transport from OR: Transported from OR on room air with adequate spontaneous ventilation    Post pain: adequate analgesia    Post assessment: no apparent anesthetic complications    Post vital signs: stable    Level of consciousness: sedated    Nausea/Vomiting: no nausea/vomiting    Complications: none          Last vitals:   Visit Vitals    /73 (BP Location: Right leg, Patient Position: Lying, BP Method: Automatic)    Pulse 73    Temp 36.4 °C (97.5 °F) (Oral)    Resp 18    Ht 6' (1.829 m)    Wt 112 kg (247 lb)    SpO2 (!) 94%    BMI 33.5 kg/m2

## 2017-03-24 NOTE — DISCHARGE INSTRUCTIONS
Gastritis (Adult)    Gastritis is inflammation and irritation of the stomach lining. It can be present for a short time (acute) or be long lasting (chronic). Gastritis is often caused by infection with bacteria called H pylori. More than a third of people in the [place] have this bacteria in their bodies. In many cases, H pylori causes no problems or symptoms. In some people, though, the infection irritates the stomach lining and causes gastritis. Other causes of stomach irritation include drinking alcohol or taking pain-relieving medicines called NSAIDs (such as aspirin or ibuprofen).   Symptoms of gastritis can include:  · Abdominal pain or bloating  · Loss of appetite  · Nausea or vomiting  · Vomiting blood or having black stools  · Feeling more tired than usual  An inflamed and irritated stomach lining is more likely to develop a sore called an ulcer. To help prevent this, gastritis should be treated.  Home care  If needed, medicines may be prescribed. If you have H pylori infection, treating it will likely relieve your symptoms. Other changes can help reduce stomach irritation and help it heal.  · If you have been prescribed medicines for H pylori infection, take them as directed. Take all of the medicine until it is finished or your healthcare provider tells you to stop, even if you feel better.  · Your healthcare provider may recommend avoiding NSAIDs. If you take daily aspirin for your heart or other medical reasons, do not stop without talking to your healthcare provider first.  · Avoid drinking alcohol.  · Stop smoking. Smoking can irritate the stomach and delay healing. As much as possible, stay away from second hand smoke.  Follow-up care  Follow up with your healthcare provider, or as advised by our staff. Testing may be needed to check for inflammation or an ulcer.  When to seek medical advice  Call your healthcare provider for any of the following:  · Stomach pain that gets worse or moves to the lower  right abdomen (appendix area)  · Chest pain that appears or gets worse, or spreads to the back, neck, shoulder, or arm  · Frequent vomiting (cant keep down liquids)  · Blood in the stool or vomit (red or black in color)  · Feeling weak or dizzy  · Fever of 100.4ºF (38ºC) or higher, or as directed by your healthcare provider  Date Last Reviewed: 6/22/2015  © 7588-9554 [employer]. [address] [place] [address]. All rights reserved. This information is not intended as a substitute for professional medical care. Always follow your healthcare professional's instructions.

## 2017-03-24 NOTE — ANESTHESIA RELEASE NOTE
Anesthesia Release from PACU Note    Patient: Semaj Allen    Procedure(s) Performed: Procedure(s) (LRB):  ESOPHAGOGASTRODUODENOSCOPY (EGD) (N/A)    Anesthesia type: MAC    Post pain: Adequate analgesia    Post assessment: no apparent anesthetic complications, tolerated procedure well and no evidence of recall    Last Vitals:   Visit Vitals    /73 (BP Location: Right leg, Patient Position: Lying, BP Method: Automatic)    Pulse 73    Temp 36.4 °C (97.5 °F) (Oral)    Resp 18    Ht 6' (1.829 m)    Wt 112 kg (247 lb)    SpO2 (!) 94%    BMI 33.5 kg/m2       Post vital signs: stable    Level of consciousness: awake and alert     Nausea/Vomiting: no nausea/no vomiting    Complications: none    Airway Patency: patent    Respiratory: unassisted, spontaneous ventilation, nasal cannula    Cardiovascular: stable    Hydration: euvolemic

## 2017-03-24 NOTE — H&P (VIEW-ONLY)
Clinic Consult:  [facility] Consultation Note    Reason for Consult:  The primary encounter diagnosis was LUQ abdominal pain. Diagnoses of Gastroesophageal reflux disease, esophagitis presence not specified and Constipation, unspecified constipation type were also pertinent to this visit.    PCP: Sigrid Torres   [address] / [place] [address]    HPI:  This is a 52 y.o. male here for evaluation of the above  He reports that over the last few weeks, he has had progressively worsening, intermittent LUQ abdominal pain.  He describes the pain as sharp.  Worse after meals.  Pain will occasionally radiate upwards into the chest.  He previously had a cardiac workup which was negative.   He denies any nausea or vomiting.  No melena or hematochezia.    He admits to chronic constipation for most of his life.  He has not tried any medications for the constipation.  He has a hard BM with straining 1-2 times per week.     Review of Systems   Constitutional: Negative for chills, fever, malaise/fatigue and weight loss.   Respiratory: Negative for cough.    Cardiovascular: Negative for chest pain.   Gastrointestinal:        Per HPI   Musculoskeletal: Negative for myalgias.   Skin: Negative for itching and rash.   Neurological: Negative for headaches.   Psychiatric/Behavioral: The patient is not nervous/anxious.        Medical History:   Past Medical History:   Diagnosis Date    Allergic rhinitis     Chest pain syndrome 2/10/2017    Coronary artery disease     Depression     GERD (gastroesophageal reflux disease)     Hepatitis C     Hepatitis C 2/10/2017    Herpes simplex     HIV (human immunodeficiency virus infection) 2/10/2017    HIV disease     Hyperlipidemia     Hypertrophy of prostate with urinary obstruction and other lower urinary tract symptoms (LUTS)        Surgical History:  Past Surgical History:   Procedure Laterality Date    CARDIAC CATHETERIZATION      jaw surgey      LEG SURGERY          Family History:   Family History   Problem Relation Age of Onset    Heart attack Mother        Social History:   Social History   Substance Use Topics    Smoking status: Former Smoker     Years: 10.00     Types: Cigarettes     Quit date: 1990    Smokeless tobacco: Never Used    Alcohol use Yes      Comment: socially       Allergies: Reviewed    Home Medications:   Current Outpatient Prescriptions on File Prior to Visit   Medication Sig Dispense Refill    abacavir-dolutegravir-lamivud (TRIUMEQ) 600- mg Tab Take by mouth once daily.      aspirin (ECOTRIN) 81 MG EC tablet Take 1 tablet (81 mg total) by mouth once daily. 30 tablet 6    atorvastatin (LIPITOR) 10 MG tablet Take 10 mg by mouth once daily.      pantoprazole (PROTONIX) 40 MG tablet Take 40 mg by mouth once daily.      tadalafil (CIALIS) 5 MG tablet Take 5 mg by mouth daily as needed for Erectile Dysfunction.      tamsulosin (FLOMAX) 0.4 mg Cp24 Take 0.4 mg by mouth once daily.      amlodipine (NORVASC) 2.5 MG tablet Take 1 tablet (2.5 mg total) by mouth once daily. 30 tablet 11    nitroGLYCERIN (NITROSTAT) 0.4 MG SL tablet Place 1 tablet (0.4 mg total) under the tongue every 5 (five) minutes as needed for Chest pain. 60 tablet 12     No current facility-administered medications on file prior to visit.        Physical Exam:  Vital Signs:  /70  Pulse 68  Wt 115 kg (253 lb 8.5 oz)  BMI 34.38 kg/m2  Body mass index is 34.38 kg/(m^2).  Physical Exam   Constitutional: He is oriented to person, place, and time. He appears well-developed and well-nourished.   HENT:   Head: Normocephalic.   Eyes: No scleral icterus.   Neck: Normal range of motion.   Cardiovascular: Normal rate and regular rhythm.    Pulmonary/Chest: Effort normal and breath sounds normal.   Abdominal: Soft. Bowel sounds are normal. He exhibits no distension. There is no tenderness.   Musculoskeletal: Normal range of motion.   Neurological: He is alert and oriented to  person, place, and time.   Skin: Skin is warm and dry.   Psychiatric: He has a normal mood and affect.   Vitals reviewed.      Labs: Pertinent labs reviewed.      Assessment:  1. LUQ abdominal pain    2. Gastroesophageal reflux disease, esophagitis presence not specified    3. Constipation, unspecified constipation type         Recommendations:  LUQ abdominal pain  Gastroesophageal reflux disease, esophagitis presence not specified  - GERD vs H. Pylori vs PUD vs possible colon spasms given his constipation  -     Case request GI: ESOPHAGOGASTRODUODENOSCOPY (EGD)    Constipation, unspecified constipation type  - Start Miralax once daily  - ? If abdominal pain is more related to this vs gastritis issues.    - May need Linzess or Amitiza if Miralax is not effective.         Return in about 4 weeks (around 4/17/2017).        Thank you so much for allowing me to participate in the care of DEACON Ramon

## 2017-03-24 NOTE — ANESTHESIA POSTPROCEDURE EVALUATION
Anesthesia Post Evaluation    Patient: Semaj Allen    Procedure(s) Performed: Procedure(s) (LRB):  ESOPHAGOGASTRODUODENOSCOPY (EGD) (N/A)    Final Anesthesia Type: MAC  Patient location during evaluation: PACU  Patient participation: Yes- Able to Participate  Level of consciousness: awake and alert and oriented  Post-procedure vital signs: reviewed and stable  Pain management: adequate  Airway patency: patent  PONV status at discharge: No PONV  Anesthetic complications: no      Cardiovascular status: blood pressure returned to baseline  Respiratory status: unassisted, room air and spontaneous ventilation  Hydration status: euvolemic  Follow-up not needed.        Visit Vitals    /73 (BP Location: Right leg, Patient Position: Lying, BP Method: Automatic)    Pulse 73    Temp 36.4 °C (97.5 °F) (Oral)    Resp 18    Ht 6' (1.829 m)    Wt 112 kg (247 lb)    SpO2 (!) 94%    BMI 33.5 kg/m2       Pain/Ciara Score: Pain Assessment Performed: Yes (3/24/2017  6:45 AM)  Presence of Pain: denies (3/24/2017  6:45 AM)  Ciara Score: 9 (3/24/2017  7:25 AM)

## 2017-03-24 NOTE — INTERVAL H&P NOTE
The patient has been examined and the H&P has been reviewed:I have reviewed this note and I agree with this assessment. The patient was seen in the GI office and remains stable for endoscopy at the time of this present evaluation. The patient is NKDA.         Anesthesia/Surgery risks, benefits and alternative options discussed and understood by patient/family.          Active Hospital Problems    Diagnosis  POA    LUQ abdominal pain [R10.12]  Yes      Resolved Hospital Problems    Diagnosis Date Resolved POA   No resolved problems to display.

## 2017-03-24 NOTE — DISCHARGE SUMMARY
[facility]  Brief Operative Note     SUMMARY     Surgery Date: 3/24/2017     Surgeon(s) and Role:     * Nathan Guerra III, MD - Primary    Assisting Surgeon: None    Pre-op Diagnosis:  LUQ abdominal pain [R10.12]  Gastroesophageal reflux disease, esophagitis presence not specified [K21.9]    Post-op Diagnosis:  Post-Op Diagnosis Codes:     * LUQ abdominal pain [R10.12]     * Gastroesophageal reflux disease, esophagitis presence not specified [K21.9]     - Gastritis  Procedure(s) (LRB):  ESOPHAGOGASTRODUODENOSCOPY (EGD) (N/A)    Anesthesia: Monitor Anesthesia Care    Description of the findings of the procedure: Procedures completed. See Procedure note for full details.    Findings/Key Components: Procedures completed. See Procedure note for full details.    Prosthetics/Devices: None    Estimated Blood Loss: * No values recorded between 3/24/2017 12:00 AM and 3/24/2017  7:30 AM *         Specimens:   Specimen (12h ago through future)    Start     Ordered    03/24/17 0716  Specimen to Pathology - Surgery  Once     Comments:  #1 Antral Bx's, gastritis R/O H pylori  #2 Antral polyps x 2    03/24/17 0717          Discharge Note    SUMMARY     Admit Date: 3/24/2017    Discharge Date and Time: 3/24/2017    Hospital Course (synopsis of major diagnoses, care, treatment, and services provided during the course of the hospital stay):  Procedures completed. See Procedure note for full details. Discharge patient when discharge criteria met.    Final Diagnosis: Post-Op Diagnosis Codes:     * LUQ abdominal pain [R10.12]     * Gastroesophageal reflux disease, esophagitis presence not specified [K21.9]     - Gastritis  Disposition: Discharge patient when discharge criteria met.    Follow Up/Patient Instructions:       Medications:  Reconciled Home Medications: Current Discharge Medication List      CONTINUE these medications which have NOT CHANGED    Details   abacavir-dolutegravir-lamivud (TRIUMEQ) 600- mg  Tab Take by mouth once daily.      amlodipine (NORVASC) 2.5 MG tablet Take 1 tablet (2.5 mg total) by mouth once daily.  Qty: 30 tablet, Refills: 11    Associated Diagnoses: Chest pain syndrome      aspirin (ECOTRIN) 81 MG EC tablet Take 1 tablet (81 mg total) by mouth once daily.  Qty: 30 tablet, Refills: 6    Associated Diagnoses: Chest pain syndrome      atorvastatin (LIPITOR) 10 MG tablet Take 10 mg by mouth once daily.      pantoprazole (PROTONIX) 40 MG tablet Take 40 mg by mouth once daily.      tadalafil (CIALIS) 5 MG tablet Take 5 mg by mouth daily as needed for Erectile Dysfunction.      tamsulosin (FLOMAX) 0.4 mg Cp24 Take 0.4 mg by mouth once daily.      nitroGLYCERIN (NITROSTAT) 0.4 MG SL tablet Place 1 tablet (0.4 mg total) under the tongue every 5 (five) minutes as needed for Chest pain.  Qty: 60 tablet, Refills: 12    Associated Diagnoses: Chest pain syndrome              Discharge Procedure Orders  Diet general     Activity as tolerated

## 2017-03-24 NOTE — ANESTHESIA PREPROCEDURE EVALUATION
03/24/2017  Semaj Allen is a 52 y.o., male.    [facility] Anesthesia Evaluation    I have reviewed the Patient Summary Reports.    I have reviewed the Nursing Notes.   I have reviewed the Medications.     Review of Systems  Anesthesia Hx:  No problems with previous Anesthesia    Social:  Former Smoker, Social Alcohol Use    Hematology/Oncology:     Oncology Normal   Hematology Comments: HIV  Hep C   EENT/Dental:   chronic allergic rhinitis   Cardiovascular:   CAD   hyperlipidemia Normal sinus rhythm  Cannot exclude Septal infarct  Abnormal ECG   Pulmonary:  Pulmonary Normal    Renal/:  Renal/ Normal     Hepatic/GI:   GERD, well controlled Liver Disease, Hepatitis 2330 last drink of fluid.   Neurological:  Neurology Normal    Endocrine:  Endocrine Normal    Dermatological:  Skin Normal    Psych:   Psychiatric History          Physical Exam  General:  Well nourished, Obesity    Airway/Jaw/Neck:  Airway Findings: Mallampati: III                Anesthesia Plan  Type of Anesthesia, risks & benefits discussed:  Anesthesia Type:  MAC  Patient's Preference:   Intra-op Monitoring Plan:   Intra-op Monitoring Plan Comments:   Post Op Pain Control Plan:   Post Op Pain Control Plan Comments:   Induction:   IV  Beta Blocker:  Patient is not currently on a Beta-Blocker (No further documentation required).       Informed Consent: Patient understands risks and agrees with Anesthesia plan.  Questions answered. Anesthesia consent signed with patient.  ASA Score: 2     Day of Surgery Review of History & Physical: I have interviewed and examined the patient. I have reviewed the patient's H&P dated: 03/24/17. There are no significant changes.  H&P update referred to the surgeon.         Ready For Surgery From Anesthesia Perspective.

## 2017-04-06 ENCOUNTER — TELEPHONE (OUTPATIENT)
Dept: GASTROENTEROLOGY | Facility: CLINIC | Age: 52
End: 2017-04-06

## 2017-04-24 ENCOUNTER — OFFICE VISIT (OUTPATIENT)
Dept: GASTROENTEROLOGY | Facility: CLINIC | Age: 52
End: 2017-04-24
Payer: COMMERCIAL

## 2017-04-24 VITALS
SYSTOLIC BLOOD PRESSURE: 140 MMHG | BODY MASS INDEX: 33.74 KG/M2 | HEART RATE: 62 BPM | WEIGHT: 249.13 LBS | DIASTOLIC BLOOD PRESSURE: 80 MMHG | HEIGHT: 72 IN

## 2017-04-24 DIAGNOSIS — K29.50 CHRONIC GASTRITIS WITHOUT BLEEDING, UNSPECIFIED GASTRITIS TYPE: Primary | ICD-10-CM

## 2017-04-24 DIAGNOSIS — K59.04 CHRONIC IDIOPATHIC CONSTIPATION: ICD-10-CM

## 2017-04-24 PROCEDURE — 99999 PR PBB SHADOW E&M-EST. PATIENT-LVL III: CPT | Mod: PBBFAC,,, | Performed by: NURSE PRACTITIONER

## 2017-04-24 PROCEDURE — 1160F RVW MEDS BY RX/DR IN RCRD: CPT | Mod: S$GLB,,, | Performed by: NURSE PRACTITIONER

## 2017-04-24 PROCEDURE — 99214 OFFICE O/P EST MOD 30 MIN: CPT | Mod: S$GLB,,, | Performed by: NURSE PRACTITIONER

## 2017-04-24 NOTE — MR AVS SNAPSHOT
[facility]  [address] [place] [address]  Phone: [phone]  Fax: [phone]                  Semaj Allen   2017 3:40 PM   Office Visit    Description:  Male : 1965   Provider:  KELSI Daniel   Department:  [facility]           Reason for Visit     Colonoscopy                To Do List           Future Appointments        Provider Department Dept Phone    10/25/2017 3:00 PM KELSI Daniel [facility] [phone]      Goals (5 Years of Data)     None      Follow-Up and Disposition     Return in about 6 months (around 10/24/2017).      [facility]     [facility] On [facility] Nurse Care Line -  Assistance  Unless otherwise directed by your provider, please contact [facility], our nurse care line that is available for  assistance.     Registered nurses in the [facility] provide: appointment scheduling, clinical advisement, health education, and other advisory services.  Call: [phone] (toll free)               Medications           Message regarding Medications     Verify the changes and/or additions to your medication regime listed below are the same as discussed with your clinician today.  If any of these changes or additions are incorrect, please notify your healthcare provider.             Verify that the below list of medications is an accurate representation of the medications you are currently taking.  If none reported, the list may be blank. If incorrect, please contact your healthcare provider. Carry this list with you in case of emergency.           Current Medications     abacavir-dolutegravir-lamivud (TRIUMEQ) 600- mg Tab Take by mouth once daily.    aspirin (ECOTRIN) 81 MG EC tablet Take 1 tablet (81 mg total) by mouth once daily.    atorvastatin (LIPITOR) 10 MG tablet Take 10 mg by mouth once daily.    pantoprazole (PROTONIX) 40 MG tablet Take 40 mg by mouth once daily.    tadalafil (CIALIS)  5 MG tablet Take 5 mg by mouth daily as needed for Erectile Dysfunction.    tamsulosin (FLOMAX) 0.4 mg Cp24 Take 0.4 mg by mouth once daily.    amlodipine (NORVASC) 2.5 MG tablet Take 1 tablet (2.5 mg total) by mouth once daily.    nitroGLYCERIN (NITROSTAT) 0.4 MG SL tablet Place 1 tablet (0.4 mg total) under the tongue every 5 (five) minutes as needed for Chest pain.           Clinical Reference Information           Your Vitals Were     BP Pulse Height Weight BMI    140/80 62 6' (1.829 m) 113 kg (249 lb 1.9 oz) 33.79 kg/m2      Blood Pressure          Most Recent Value    BP  (!)  140/80      Allergies as of 4/24/2017     No Known Allergies      Immunizations Administered on Date of Encounter - 4/24/2017     None      [facility] Sign-Up     Activating your [facility] account is as easy as 1-2-3!     1) Visit [email or web address], select Sign Up Now, enter this activation code and your date of birth, then select Next.  [email or web address]  Expires: 6/8/2017  3:49 PM      2) Create a username and password to use when you visit [facility] in the future and select a security question in case you lose your password and select Next.    3) Enter your e-mail address and click Sign Up!    Additional Information  If you have questions, please e-mail [email or web address] or call [phone] to talk to our [facility] staff. Remember, [facility] is NOT to be used for urgent needs. For medical emergencies, dial 911.         Language Assistance Services     ATTENTION: Language assistance services are available, free of charge. Please call [phone].      ATENCIÓN: Si habla español, tiene a garcia disposición servicios gratuitos de asistencia lingüística. Llame al [phone].     [facility] Ý: N?u b?n nói Ti?ng Vi?t, có các d?ch v? h? tr? ngôn ng? mi?n phí dành cho b?n. G?i s? [phone].         [facility] complies with applicable Federal civil rights laws and does not discriminate on the basis of race, color, national  origin, age, disability, or sex.

## 2017-04-25 NOTE — PROGRESS NOTES
Clinic Follow Up:  [facility] Follow Up Note    Reason for Follow Up:  The primary encounter diagnosis was Chronic gastritis without bleeding, unspecified gastritis type. A diagnosis of Chronic idiopathic constipation was also pertinent to this visit.    PCP: Sigrid Torres       HPI:  This is a 52 y.o. male here for follow up of the above  He reports that since his last visit, he has been feeling significantly better.  His recent EGD was unremarkable with the exception of chronic gastritis and gastric polyps.  Pathology was negative for dysplasia or infection.  He also reports that his constipation is better controlled since his last visit, which has also improved his upper abdominal complaints.   Denies any abdominal pain.  No nausea or vomiting.  No change in bowel pattern.  No melena or hematochezia. No weight loss.      Review of Systems   Constitutional: Negative for chills, fever, malaise/fatigue and weight loss.   Respiratory: Negative for cough.    Cardiovascular: Negative for chest pain.   Gastrointestinal:        Per HPI   Musculoskeletal: Negative for myalgias.   Skin: Negative for itching and rash.   Neurological: Negative for headaches.   Psychiatric/Behavioral: The patient is not nervous/anxious.        Medical History:  Past Medical History:   Diagnosis Date    Allergic rhinitis     Chest pain syndrome 2/10/2017    Coronary artery disease     Depression     GERD (gastroesophageal reflux disease)     Hepatitis C     Hepatitis C 2/10/2017    Herpes simplex     HIV (human immunodeficiency virus infection) 2/10/2017    HIV disease     Hyperlipidemia     Hypertrophy of prostate with urinary obstruction and other lower urinary tract symptoms (LUTS)        Surgical History:   Past Surgical History:   Procedure Laterality Date    CARDIAC CATHETERIZATION      jaw surgey      LEG SURGERY         Family History:   Family History   Problem Relation Age of Onset    Heart attack  Mother        Social History:   Social History   Substance Use Topics    Smoking status: Former Smoker     Packs/day: 0.50     Years: 10.00     Types: Cigarettes     Quit date: 1990    Smokeless tobacco: Never Used    Alcohol use Yes      Comment: socially       Allergies: Reviewed    Home Medications:  Current Outpatient Prescriptions on File Prior to Visit   Medication Sig Dispense Refill    abacavir-dolutegravir-lamivud (TRIUMEQ) 600- mg Tab Take by mouth once daily.      aspirin (ECOTRIN) 81 MG EC tablet Take 1 tablet (81 mg total) by mouth once daily. 30 tablet 6    atorvastatin (LIPITOR) 10 MG tablet Take 10 mg by mouth once daily.      pantoprazole (PROTONIX) 40 MG tablet Take 40 mg by mouth once daily.      tadalafil (CIALIS) 5 MG tablet Take 5 mg by mouth daily as needed for Erectile Dysfunction.      tamsulosin (FLOMAX) 0.4 mg Cp24 Take 0.4 mg by mouth once daily.      amlodipine (NORVASC) 2.5 MG tablet Take 1 tablet (2.5 mg total) by mouth once daily. 30 tablet 11    nitroGLYCERIN (NITROSTAT) 0.4 MG SL tablet Place 1 tablet (0.4 mg total) under the tongue every 5 (five) minutes as needed for Chest pain. 60 tablet 12     No current facility-administered medications on file prior to visit.        Physical Exam:  Vital Signs:  BP (!) 140/80  Pulse 62  Ht 6' (1.829 m)  Wt 113 kg (249 lb 1.9 oz)  BMI 33.79 kg/m2  Body mass index is 33.79 kg/(m^2).  Physical Exam   Constitutional: He is oriented to person, place, and time. He appears well-developed.   HENT:   Head: Normocephalic.   Eyes: No scleral icterus.   Neck: Normal range of motion.   Cardiovascular: Normal rate and regular rhythm.    Pulmonary/Chest: Effort normal and breath sounds normal.   Abdominal: Soft. Bowel sounds are normal. He exhibits no distension. There is no tenderness.   Musculoskeletal: Normal range of motion.   Neurological: He is alert and oriented to person, place, and time.   Skin: Skin is warm and dry.    Psychiatric: He has a normal mood and affect.   Vitals reviewed.      Labs: Pertinent labs reviewed.      Assessment:  1. Chronic gastritis without bleeding, unspecified gastritis type    2. Chronic idiopathic constipation        Recommendations:  Chronic gastritis without bleeding, unspecified gastritis type  - Continue PPI  - GERD diet and lifestyle discussed.     Chronic idiopathic constipation  - Miralax once daily PRN  - Increase water and dietary fiber intake.         Return to Clinic:    Return in about 6 months (around 10/24/2017).

## 2022-11-22 ENCOUNTER — TELEPHONE (OUTPATIENT)
Dept: CARDIOLOGY | Facility: CLINIC | Age: 57
End: 2022-11-22
Payer: COMMERCIAL

## 2022-11-22 NOTE — TELEPHONE ENCOUNTER
Placed call to patient to schedule appointment and patient states he will call back after the New year when he is ready to schedule.

## 2023-05-23 DIAGNOSIS — R07.9 CHEST PAIN SYNDROME: Primary | ICD-10-CM

## 2023-05-26 ENCOUNTER — HOSPITAL ENCOUNTER (OUTPATIENT)
Dept: CARDIOLOGY | Facility: HOSPITAL | Age: 58
Discharge: HOME | End: 2023-05-26
Attending: INTERNAL MEDICINE
Payer: COMMERCIAL

## 2023-05-26 ENCOUNTER — OFFICE VISIT (OUTPATIENT)
Dept: CARDIOLOGY | Facility: CLINIC | Age: 58
End: 2023-05-26
Payer: COMMERCIAL

## 2023-05-26 VITALS
OXYGEN SATURATION: 94 % | SYSTOLIC BLOOD PRESSURE: 120 MMHG | BODY MASS INDEX: 36.61 KG/M2 | DIASTOLIC BLOOD PRESSURE: 70 MMHG | HEART RATE: 88 BPM | WEIGHT: 270.31 LBS | HEIGHT: 72 IN

## 2023-05-26 DIAGNOSIS — B20 HIV INFECTION, UNSPECIFIED SYMPTOM STATUS: ICD-10-CM

## 2023-05-26 DIAGNOSIS — B19.20 HEPATITIS C VIRUS INFECTION WITHOUT HEPATIC COMA, UNSPECIFIED CHRONICITY: ICD-10-CM

## 2023-05-26 DIAGNOSIS — R07.9 CHEST PAIN SYNDROME: ICD-10-CM

## 2023-05-26 DIAGNOSIS — M79.89 LEG SWELLING: Primary | ICD-10-CM

## 2023-05-26 DIAGNOSIS — R07.9 CHEST PAIN, UNSPECIFIED TYPE: ICD-10-CM

## 2023-05-26 DIAGNOSIS — E78.5 HYPERLIPIDEMIA, UNSPECIFIED HYPERLIPIDEMIA TYPE: ICD-10-CM

## 2023-05-26 PROCEDURE — 3074F SYST BP LT 130 MM HG: CPT | Mod: CPTII,S$GLB,, | Performed by: INTERNAL MEDICINE

## 2023-05-26 PROCEDURE — 93010 ELECTROCARDIOGRAM REPORT: CPT | Mod: ,,, | Performed by: INTERNAL MEDICINE

## 2023-05-26 PROCEDURE — 99204 PR OFFICE/OUTPT VISIT, NEW, LEVL IV, 45-59 MIN: ICD-10-PCS | Mod: S$GLB,,, | Performed by: INTERNAL MEDICINE

## 2023-05-26 PROCEDURE — 99204 OFFICE O/P NEW MOD 45 MIN: CPT | Mod: S$GLB,,, | Performed by: INTERNAL MEDICINE

## 2023-05-26 PROCEDURE — 99999 PR PBB SHADOW E&M-EST. PATIENT-LVL IV: CPT | Mod: PBBFAC,,, | Performed by: INTERNAL MEDICINE

## 2023-05-26 PROCEDURE — 3008F PR BODY MASS INDEX (BMI) DOCUMENTED: ICD-10-PCS | Mod: CPTII,S$GLB,, | Performed by: INTERNAL MEDICINE

## 2023-05-26 PROCEDURE — 1160F RVW MEDS BY RX/DR IN RCRD: CPT | Mod: CPTII,S$GLB,, | Performed by: INTERNAL MEDICINE

## 2023-05-26 PROCEDURE — 93005 ELECTROCARDIOGRAM TRACING: CPT

## 2023-05-26 PROCEDURE — 3008F BODY MASS INDEX DOCD: CPT | Mod: CPTII,S$GLB,, | Performed by: INTERNAL MEDICINE

## 2023-05-26 PROCEDURE — 3074F PR MOST RECENT SYSTOLIC BLOOD PRESSURE < 130 MM HG: ICD-10-PCS | Mod: CPTII,S$GLB,, | Performed by: INTERNAL MEDICINE

## 2023-05-26 PROCEDURE — 1160F PR REVIEW ALL MEDS BY PRESCRIBER/CLIN PHARMACIST DOCUMENTED: ICD-10-PCS | Mod: CPTII,S$GLB,, | Performed by: INTERNAL MEDICINE

## 2023-05-26 PROCEDURE — 3078F DIAST BP <80 MM HG: CPT | Mod: CPTII,S$GLB,, | Performed by: INTERNAL MEDICINE

## 2023-05-26 PROCEDURE — 93010 EKG 12-LEAD: ICD-10-PCS | Mod: ,,, | Performed by: INTERNAL MEDICINE

## 2023-05-26 PROCEDURE — 99999 PR PBB SHADOW E&M-EST. PATIENT-LVL IV: ICD-10-PCS | Mod: PBBFAC,,, | Performed by: INTERNAL MEDICINE

## 2023-05-26 PROCEDURE — 1159F PR MEDICATION LIST DOCUMENTED IN MEDICAL RECORD: ICD-10-PCS | Mod: CPTII,S$GLB,, | Performed by: INTERNAL MEDICINE

## 2023-05-26 PROCEDURE — 1159F MED LIST DOCD IN RCRD: CPT | Mod: CPTII,S$GLB,, | Performed by: INTERNAL MEDICINE

## 2023-05-26 PROCEDURE — 3078F PR MOST RECENT DIASTOLIC BLOOD PRESSURE < 80 MM HG: ICD-10-PCS | Mod: CPTII,S$GLB,, | Performed by: INTERNAL MEDICINE

## 2023-05-26 RX ORDER — EMTRICITABINE, RILPIVIRINE HYDROCHLORIDE, AND TENOFOVIR ALAFENAMIDE 200; 25; 25 MG/1; MG/1; MG/1
TABLET ORAL DAILY
COMMUNITY

## 2023-05-26 RX ORDER — METOPROLOL SUCCINATE 100 MG/1
100 TABLET, EXTENDED RELEASE ORAL DAILY
COMMUNITY

## 2023-05-26 RX ORDER — OLMESARTAN MEDOXOMIL, AMLODIPINE AND HYDROCHLOROTHIAZIDE TABLET 40/5/25 MG 40; 5; 25 MG/1; MG/1; MG/1
TABLET ORAL DAILY
COMMUNITY

## 2023-05-26 RX ORDER — FUROSEMIDE 20 MG/1
20 TABLET ORAL DAILY
COMMUNITY

## 2023-05-26 RX ORDER — EZETIMIBE 10 MG/1
10 TABLET ORAL DAILY
COMMUNITY

## 2023-05-26 NOTE — PROGRESS NOTES
Subjective:   Patient ID:  Semaj Allen is a 58 y.o. male who presents for evaluation of No chief complaint on file.      HPI  2/17/2017  A 51 yo male who has hep c hiv was seen initially for chest pain had stress echo that was negative .he is still having chest pain that  describes as sharp chest pain occurred in the morning associated with shortness of breath he has  Nausea but no diaphoresis no exertional symptoms.he ahd a stress echo that was negative. He si concerned due to his multiple risk factors for cad and wants to be absolutely sure he ahs no cad.  si willing to have an angio for that to resolve this atypical pain.    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 He has shortness of breath while he exerts himself and when he pushes his physical activity intensity wise. He snores at nite he does not stop breathing. Has his legs welling resolved with diuretics. He works 2 jobs the swelling in his legs happens at the end of the day. He is compliant with slat lately he is compliant with meds. Has no chest pain syncope near syncope palpitation.   HE HAD A HEART CATH PREVIOUSLY NORMAL CORONARIES   Past Medical History:   Diagnosis Date    Allergic rhinitis     Chest pain syndrome 2/10/2017    Coronary artery disease     Depression     GERD (gastroesophageal reflux disease)     Hepatitis C     Hepatitis C 2/10/2017    Herpes simplex     HIV (human immunodeficiency virus infection) 2/10/2017    HIV disease     Hyperlipidemia     Hypertrophy of prostate with urinary obstruction and other lower urinary tract symptoms (LUTS)     Leg swelling 5/26/2023       Past Surgical History:   Procedure Laterality Date    CARDIAC CATHETERIZATION      jaw surgey      LEG SURGERY         Social History     Tobacco Use    Smoking status: Former     Packs/day: 0.50     Years: 10.00     Pack years: 5.00     Types: Cigarettes     Quit date: 1990     Years  since quittin.4    Smokeless tobacco: Never   Substance Use Topics    Alcohol use: Yes     Comment: socially       Family History   Problem Relation Age of Onset    Heart attack Mother        Current Outpatient Medications   Medication Sig    aspirin (ECOTRIN) 81 MG EC tablet Take 1 tablet (81 mg total) by mouth once daily. (Patient taking differently: Take 81 mg by mouth as needed.)    tlkqlstire-yzgltjtf-yfftsu ala (ODEFSEY) 200-25-25 mg Tab Take by mouth once daily.    ezetimibe (ZETIA) 10 mg tablet Take 10 mg by mouth once daily.    furosemide (LASIX) 20 MG tablet Take 20 mg by mouth once daily.    metoprolol succinate (TOPROL-XL) 100 MG 24 hr tablet Take 100 mg by mouth once daily.    olmesartan-amLODIPin-hcthiazid 40-5-25 mg Tab Take by mouth once daily.    pantoprazole (PROTONIX) 40 MG tablet Take 40 mg by mouth once daily.    tamsulosin (FLOMAX) 0.4 mg Cp24 Take 0.4 mg by mouth once daily.    abacavir-dolutegravir-lamivud (TRIUMEQ) 600- mg Tab Take by mouth once daily.    amlodipine (NORVASC) 2.5 MG tablet Take 1 tablet (2.5 mg total) by mouth once daily. (Patient not taking: Reported on 2023)    atorvastatin (LIPITOR) 10 MG tablet Take 10 mg by mouth once daily.    nitroGLYCERIN (NITROSTAT) 0.4 MG SL tablet Place 1 tablet (0.4 mg total) under the tongue every 5 (five) minutes as needed for Chest pain. (Patient not taking: Reported on 2023)    tadalafil (CIALIS) 5 MG tablet Take 5 mg by mouth daily as needed for Erectile Dysfunction.     No current facility-administered medications for this visit.     Current Outpatient Medications on File Prior to Visit   Medication Sig    aspirin (ECOTRIN) 81 MG EC tablet Take 1 tablet (81 mg total) by mouth once daily. (Patient taking differently: Take 81 mg by mouth as needed.)    mujctvvhrw-udjtbobu-dvjwxy ala (ODEFSEY) 200-25-25 mg Tab Take by mouth once daily.    ezetimibe (ZETIA) 10 mg tablet Take 10 mg by mouth once daily.    furosemide (LASIX)  20 MG tablet Take 20 mg by mouth once daily.    metoprolol succinate (TOPROL-XL) 100 MG 24 hr tablet Take 100 mg by mouth once daily.    olmesartan-amLODIPin-hcthiazid 40-5-25 mg Tab Take by mouth once daily.    pantoprazole (PROTONIX) 40 MG tablet Take 40 mg by mouth once daily.    tamsulosin (FLOMAX) 0.4 mg Cp24 Take 0.4 mg by mouth once daily.    abacavir-dolutegravir-lamivud (TRIUMEQ) 600- mg Tab Take by mouth once daily.    amlodipine (NORVASC) 2.5 MG tablet Take 1 tablet (2.5 mg total) by mouth once daily. (Patient not taking: Reported on 5/26/2023)    atorvastatin (LIPITOR) 10 MG tablet Take 10 mg by mouth once daily.    nitroGLYCERIN (NITROSTAT) 0.4 MG SL tablet Place 1 tablet (0.4 mg total) under the tongue every 5 (five) minutes as needed for Chest pain. (Patient not taking: Reported on 5/26/2023)    tadalafil (CIALIS) 5 MG tablet Take 5 mg by mouth daily as needed for Erectile Dysfunction.     No current facility-administered medications on file prior to visit.       Review of patient's allergies indicates:  No Known Allergies    Review of Systems   Constitutional: Negative for malaise/fatigue.   Eyes:  Negative for blurred vision.   Cardiovascular:  Positive for leg swelling. Negative for chest pain, claudication, cyanosis, dyspnea on exertion, irregular heartbeat, near-syncope, orthopnea, palpitations and paroxysmal nocturnal dyspnea.   Respiratory:  Positive for snoring. Negative for cough, hemoptysis and shortness of breath.    Hematologic/Lymphatic: Negative for bleeding problem. Does not bruise/bleed easily.   Skin:  Negative for dry skin and itching.   Musculoskeletal:  Negative for falls, muscle weakness and myalgias.   Gastrointestinal:  Negative for abdominal pain, diarrhea, heartburn, hematemesis, hematochezia and melena.   Genitourinary:  Negative for flank pain and hematuria.   Neurological:  Negative for dizziness, focal weakness, headaches, light-headedness, numbness, paresthesias,  seizures and weakness.   Psychiatric/Behavioral:  Negative for altered mental status and memory loss. The patient is not nervous/anxious.    Allergic/Immunologic: Negative for hives.     Objective:   Physical Exam  Vitals and nursing note reviewed.   Constitutional:       General: He is not in acute distress.     Appearance: He is well-developed. He is not diaphoretic.   HENT:      Head: Normocephalic and atraumatic.   Eyes:      General:         Right eye: No discharge.         Left eye: No discharge.      Pupils: Pupils are equal, round, and reactive to light.   Neck:      Thyroid: No thyromegaly.      Vascular: No JVD.   Cardiovascular:      Rate and Rhythm: Normal rate and regular rhythm.      Pulses: Intact distal pulses.      Heart sounds: Normal heart sounds. No murmur heard.    No friction rub. No gallop.   Pulmonary:      Effort: Pulmonary effort is normal. No respiratory distress.      Breath sounds: Normal breath sounds. No wheezing or rales.   Chest:      Chest wall: No tenderness.   Abdominal:      General: Bowel sounds are normal. There is no distension.      Palpations: Abdomen is soft.      Tenderness: There is no abdominal tenderness.   Musculoskeletal:         General: Normal range of motion.      Cervical back: Neck supple.      Right lower leg: Edema present.      Left lower leg: Edema present.   Skin:     General: Skin is warm and dry.      Findings: No erythema or rash.   Neurological:      Mental Status: He is alert and oriented to person, place, and time.      Cranial Nerves: No cranial nerve deficit.   Psychiatric:         Behavior: Behavior normal.     Vitals:    05/26/23 1048 05/26/23 1050   BP: 122/74 120/70   BP Location: Left arm Right arm   Patient Position: Sitting Sitting   BP Method: Large (Manual) Large (Manual)   Pulse: 88    SpO2: (!) 94%    Weight: 122.6 kg (270 lb 4.5 oz)    Height: 6' (1.829 m)      No results found for: CHOL  Body mass index is 36.66 kg/m².   No results found  for: LABA1C, HGBA1C   BMP  Lab Results   Component Value Date     02/17/2017    K 4.0 02/17/2017     02/17/2017    CO2 26 02/17/2017    BUN 15 02/17/2017    CREATININE 1.3 02/17/2017    CALCIUM 9.3 02/17/2017    ANIONGAP 8 02/17/2017      No results found for: HDL  No results found for: LDLCALC  No results found for: TRIG  No results found for: CHOLHDL    Chemistry        Component Value Date/Time     02/17/2017 1110    K 4.0 02/17/2017 1110     02/17/2017 1110    CO2 26 02/17/2017 1110    BUN 15 02/17/2017 1110    CREATININE 1.3 02/17/2017 1110    GLU 93 02/17/2017 1110        Component Value Date/Time    CALCIUM 9.3 02/17/2017 1110    ESTGFRAFRICA >60.0 02/17/2017 1110    EGFRNONAA >60.0 02/17/2017 1110          No results found for: TSH  Lab Results   Component Value Date    INR 1.0 02/17/2017     Lab Results   Component Value Date    WBC 8.68 02/17/2017    HGB 14.6 02/17/2017    HCT 43.0 02/17/2017    MCV 91 02/17/2017     02/17/2017     BNP  @LABRCNTIP(BNP,BNPTRIAGEBLO)@  CrCl cannot be calculated (Patient's most recent lab result is older than the maximum 7 days allowed.).  Assessment:     1. Leg swelling    2. Chest pain, unspecified type    3. Hyperlipidemia, unspecified hyperlipidemia type    4. HIV infection, unspecified symptom status    5. Hepatitis C virus infection without hepatic coma, unspecified chronicity      HIS LEG SWELLING SEEMS TO BE VENOUS POOLING AND HAVING 2 JOBS AND STANDING ON HIS FEET HE WAS COUNSELED ABOUT SLAT COMPLIANCE LEG ELEVATION AND  WEARING COMPRESSION SOCKS.   HLP ON MEDICAL THERAPY TOLERATED MEDS WELL CONTINUE THE SAME   HTN CONTROLLED HAS BEEN NON COMPLIANT WITH SALT COUNSELED CONTINUE SAME MEDS.    HAS SNORING WITH SOME SHORTNESS OF BREATH PREVIOUS HEART CATH SHOWED NORMAL CORONARIES WILL HOWEVER NEED TO GET ECHO TO EVALUATE FOR PULMONARY HTN AND CONSIDERATION FOR SLEEP APNEA WILL GET ALSO BNP MAKE SURE NO CHF.   DISCUSSED WITH DR MAR BY  PHONE    Plan:   ECHO    BNP COMPRESSION SOCKS LOW SALT DIET   GET LABS FOR REVIEW  PHONE REVIEW AND DECIDE F/U

## 2023-05-29 ENCOUNTER — TELEPHONE (OUTPATIENT)
Dept: CARDIOLOGY | Facility: CLINIC | Age: 58
End: 2023-05-29
Payer: COMMERCIAL

## 2023-05-30 ENCOUNTER — HOSPITAL ENCOUNTER (OUTPATIENT)
Dept: CARDIOLOGY | Facility: HOSPITAL | Age: 58
Discharge: HOME | End: 2023-05-30
Attending: INTERNAL MEDICINE
Payer: COMMERCIAL

## 2023-05-30 VITALS — HEIGHT: 72 IN | WEIGHT: 270 LBS | BODY MASS INDEX: 36.57 KG/M2

## 2023-05-30 DIAGNOSIS — B20 HIV INFECTION, UNSPECIFIED SYMPTOM STATUS: ICD-10-CM

## 2023-05-30 DIAGNOSIS — M79.89 LEG SWELLING: ICD-10-CM

## 2023-05-30 LAB
AV INDEX (PROSTH): 0.76
AV MEAN GRADIENT: 6 MMHG
AV PEAK GRADIENT: 11 MMHG
AV VALVE AREA: 2.58 CM2
AV VELOCITY RATIO: 0.84
BSA FOR ECHO PROCEDURE: 2.49 M2
CV ECHO LV RWT: 0.45 CM
DOP CALC AO PEAK VEL: 1.69 M/S
DOP CALC AO VTI: 30.1 CM
DOP CALC LVOT AREA: 3.4 CM2
DOP CALC LVOT DIAMETER: 2.08 CM
DOP CALC LVOT PEAK VEL: 1.42 M/S
DOP CALC LVOT STROKE VOLUME: 77.77 CM3
DOP CALC RVOT PEAK VEL: 0.85 M/S
DOP CALC RVOT VTI: 16.2 CM
DOP CALCLVOT PEAK VEL VTI: 22.9 CM
E WAVE DECELERATION TIME: 263 MSEC
E/A RATIO: 0.86
E/E' RATIO: 6.57 M/S
ECHO LV POSTERIOR WALL: 1.1 CM (ref 0.6–1.1)
EJECTION FRACTION: 65 %
FRACTIONAL SHORTENING: 37 % (ref 28–44)
INTERVENTRICULAR SEPTUM: 1.1 CM (ref 0.6–1.1)
IVRT: 74.22 MSEC
LA MAJOR: 5.26 CM
LA MINOR: 5.26 CM
LA WIDTH: 3.4 CM
LEFT ATRIUM SIZE: 3.65 CM
LEFT ATRIUM VOLUME INDEX MOD: 22.5 ML/M2
LEFT ATRIUM VOLUME INDEX: 22.9 ML/M2
LEFT ATRIUM VOLUME MOD: 54.34 CM3
LEFT ATRIUM VOLUME: 55.49 CM3
LEFT INTERNAL DIMENSION IN SYSTOLE: 3.09 CM (ref 2.1–4)
LEFT VENTRICLE DIASTOLIC VOLUME INDEX: 46.24 ML/M2
LEFT VENTRICLE DIASTOLIC VOLUME: 111.89 ML
LEFT VENTRICLE MASS INDEX: 82 G/M2
LEFT VENTRICLE SYSTOLIC VOLUME INDEX: 15.5 ML/M2
LEFT VENTRICLE SYSTOLIC VOLUME: 37.48 ML
LEFT VENTRICULAR INTERNAL DIMENSION IN DIASTOLE: 4.88 CM (ref 3.5–6)
LEFT VENTRICULAR MASS: 199.18 G
LV LATERAL E/E' RATIO: 5.31 M/S
LV SEPTAL E/E' RATIO: 8.63 M/S
LVOT MG: 3.8 MMHG
LVOT MV: 0.9 CM/S
MV PEAK A VEL: 0.8 M/S
MV PEAK E VEL: 0.69 M/S
PISA TR MAX VEL: 2.14 M/S
PULM VEIN S/D RATIO: 1.56
PV MEAN GRADIENT: 1.5 MMHG
PV PEAK D VEL: 0.5 M/S
PV PEAK S VEL: 0.78 M/S
PV PEAK VELOCITY: 1.26 CM/S
RA MAJOR: 4.91 CM
RA PRESSURE: 3 MMHG
RA WIDTH: 2.8 CM
RIGHT VENTRICULAR END-DIASTOLIC DIMENSION: 3.52 CM
SINUS: 3.26 CM
STJ: 3.41 CM
TDI LATERAL: 0.13 M/S
TDI SEPTAL: 0.08 M/S
TDI: 0.11 M/S
TR MAX PG: 18 MMHG
TV REST PULMONARY ARTERY PRESSURE: 21 MMHG

## 2023-05-30 PROCEDURE — 93306 ECHO (CUPID ONLY): ICD-10-PCS | Mod: 26,,, | Performed by: STUDENT IN AN ORGANIZED HEALTH CARE EDUCATION/TRAINING PROGRAM

## 2023-05-30 PROCEDURE — 93306 TTE W/DOPPLER COMPLETE: CPT | Mod: 26,,, | Performed by: STUDENT IN AN ORGANIZED HEALTH CARE EDUCATION/TRAINING PROGRAM

## 2023-05-30 PROCEDURE — 93306 TTE W/DOPPLER COMPLETE: CPT
